# Patient Record
Sex: MALE | Race: OTHER | HISPANIC OR LATINO | URBAN - METROPOLITAN AREA
[De-identification: names, ages, dates, MRNs, and addresses within clinical notes are randomized per-mention and may not be internally consistent; named-entity substitution may affect disease eponyms.]

---

## 2020-06-01 ENCOUNTER — OUTPATIENT (OUTPATIENT)
Dept: OUTPATIENT SERVICES | Facility: HOSPITAL | Age: 60
LOS: 1 days | Discharge: HOME | End: 2020-06-01
Payer: MEDICAID

## 2020-06-01 DIAGNOSIS — F11.20 OPIOID DEPENDENCE, UNCOMPLICATED: ICD-10-CM

## 2020-06-01 PROCEDURE — 99205 OFFICE O/P NEW HI 60 MIN: CPT

## 2020-06-08 ENCOUNTER — OUTPATIENT (OUTPATIENT)
Dept: OUTPATIENT SERVICES | Facility: HOSPITAL | Age: 60
LOS: 1 days | Discharge: HOME | End: 2020-06-08

## 2020-06-08 DIAGNOSIS — F11.20 OPIOID DEPENDENCE, UNCOMPLICATED: ICD-10-CM

## 2020-06-08 LAB
A1C WITH ESTIMATED AVERAGE GLUCOSE RESULT: 8.5 % — HIGH (ref 4–5.6)
ALBUMIN SERPL ELPH-MCNC: 4.4 G/DL — SIGNIFICANT CHANGE UP (ref 3.5–5.2)
ALP SERPL-CCNC: 104 U/L — SIGNIFICANT CHANGE UP (ref 30–115)
ALT FLD-CCNC: 26 U/L — SIGNIFICANT CHANGE UP (ref 0–41)
ANION GAP SERPL CALC-SCNC: 16 MMOL/L — HIGH (ref 7–14)
APPEARANCE UR: CLEAR — SIGNIFICANT CHANGE UP
AST SERPL-CCNC: 15 U/L — SIGNIFICANT CHANGE UP (ref 0–41)
BILIRUB SERPL-MCNC: 0.4 MG/DL — SIGNIFICANT CHANGE UP (ref 0.2–1.2)
BILIRUB UR-MCNC: NEGATIVE — SIGNIFICANT CHANGE UP
BUN SERPL-MCNC: 12 MG/DL — SIGNIFICANT CHANGE UP (ref 10–20)
CALCIUM SERPL-MCNC: 9 MG/DL — SIGNIFICANT CHANGE UP (ref 8.5–10.1)
CHLORIDE SERPL-SCNC: 96 MMOL/L — LOW (ref 98–110)
CHOLEST SERPL-MCNC: 129 MG/DL — SIGNIFICANT CHANGE UP (ref 100–200)
CO2 SERPL-SCNC: 24 MMOL/L — SIGNIFICANT CHANGE UP (ref 17–32)
COLOR SPEC: SIGNIFICANT CHANGE UP
CREAT SERPL-MCNC: 0.8 MG/DL — SIGNIFICANT CHANGE UP (ref 0.7–1.5)
DIFF PNL FLD: NEGATIVE — SIGNIFICANT CHANGE UP
ESTIMATED AVERAGE GLUCOSE: 197 MG/DL — HIGH (ref 68–114)
ETHANOL SERPL-MCNC: <10 MG/DL — SIGNIFICANT CHANGE UP
GLUCOSE SERPL-MCNC: 362 MG/DL — HIGH (ref 70–99)
GLUCOSE UR QL: ABNORMAL
HCT VFR BLD CALC: 42.7 % — SIGNIFICANT CHANGE UP (ref 42–52)
HDLC SERPL-MCNC: 30 MG/DL — LOW
HGB BLD-MCNC: 13.7 G/DL — LOW (ref 14–18)
KETONES UR-MCNC: NEGATIVE — SIGNIFICANT CHANGE UP
LEUKOCYTE ESTERASE UR-ACNC: NEGATIVE — SIGNIFICANT CHANGE UP
LIPID PNL WITH DIRECT LDL SERPL: 69 MG/DL — SIGNIFICANT CHANGE UP (ref 4–129)
MAGNESIUM SERPL-MCNC: 1.9 MG/DL — SIGNIFICANT CHANGE UP (ref 1.8–2.4)
MCHC RBC-ENTMCNC: 28 PG — SIGNIFICANT CHANGE UP (ref 27–31)
MCHC RBC-ENTMCNC: 32.1 G/DL — SIGNIFICANT CHANGE UP (ref 32–37)
MCV RBC AUTO: 87.3 FL — SIGNIFICANT CHANGE UP (ref 80–94)
NITRITE UR-MCNC: NEGATIVE — SIGNIFICANT CHANGE UP
NRBC # BLD: 0 /100 WBCS — SIGNIFICANT CHANGE UP (ref 0–0)
PH UR: 6 — SIGNIFICANT CHANGE UP (ref 5–8)
PLATELET # BLD AUTO: 313 K/UL — SIGNIFICANT CHANGE UP (ref 130–400)
POTASSIUM SERPL-MCNC: 4.5 MMOL/L — SIGNIFICANT CHANGE UP (ref 3.5–5)
POTASSIUM SERPL-SCNC: 4.5 MMOL/L — SIGNIFICANT CHANGE UP (ref 3.5–5)
PROT SERPL-MCNC: 6.9 G/DL — SIGNIFICANT CHANGE UP (ref 6–8)
PROT UR-MCNC: NEGATIVE — SIGNIFICANT CHANGE UP
RBC # BLD: 4.89 M/UL — SIGNIFICANT CHANGE UP (ref 4.7–6.1)
RBC # FLD: 13 % — SIGNIFICANT CHANGE UP (ref 11.5–14.5)
SODIUM SERPL-SCNC: 136 MMOL/L — SIGNIFICANT CHANGE UP (ref 135–146)
SP GR SPEC: 1.02 — SIGNIFICANT CHANGE UP (ref 1.01–1.02)
TOTAL CHOLESTEROL/HDL RATIO MEASUREMENT: 4.3 RATIO — SIGNIFICANT CHANGE UP (ref 4–5.5)
TRIGL SERPL-MCNC: 241 MG/DL — HIGH (ref 10–149)
UROBILINOGEN FLD QL: SIGNIFICANT CHANGE UP
WBC # BLD: 7.12 K/UL — SIGNIFICANT CHANGE UP (ref 4.8–10.8)
WBC # FLD AUTO: 7.12 K/UL — SIGNIFICANT CHANGE UP (ref 4.8–10.8)

## 2020-06-09 LAB
HAV IGM SER-ACNC: SIGNIFICANT CHANGE UP
HBV CORE IGM SER-ACNC: SIGNIFICANT CHANGE UP
HBV SURFACE AG SER-ACNC: SIGNIFICANT CHANGE UP
HCV AB S/CO SERPL IA: 11.9 S/CO — HIGH (ref 0–0.99)
HCV AB SERPL-IMP: REACTIVE
HIV 1+2 AB+HIV1 P24 AG SERPL QL IA: SIGNIFICANT CHANGE UP
T PALLIDUM AB TITR SER: NEGATIVE — SIGNIFICANT CHANGE UP

## 2020-06-10 LAB
GAMMA INTERFERON BACKGROUND BLD IA-ACNC: 0.01 IU/ML — SIGNIFICANT CHANGE UP
HCV RNA FLD QL NAA+PROBE: SIGNIFICANT CHANGE UP
HCV RNA SPEC QL PROBE+SIG AMP: SIGNIFICANT CHANGE UP
M TB IFN-G BLD-IMP: NEGATIVE — SIGNIFICANT CHANGE UP
M TB IFN-G CD4+ BCKGRND COR BLD-ACNC: 0 IU/ML — SIGNIFICANT CHANGE UP
M TB IFN-G CD4+CD8+ BCKGRND COR BLD-ACNC: 0 IU/ML — SIGNIFICANT CHANGE UP
QUANT TB PLUS MITOGEN MINUS NIL: 8.3 IU/ML — SIGNIFICANT CHANGE UP

## 2020-06-15 ENCOUNTER — OUTPATIENT (OUTPATIENT)
Dept: OUTPATIENT SERVICES | Facility: HOSPITAL | Age: 60
LOS: 1 days | Discharge: HOME | End: 2020-06-15

## 2020-06-15 DIAGNOSIS — F11.20 OPIOID DEPENDENCE, UNCOMPLICATED: ICD-10-CM

## 2020-06-22 ENCOUNTER — OUTPATIENT (OUTPATIENT)
Dept: OUTPATIENT SERVICES | Facility: HOSPITAL | Age: 60
LOS: 1 days | Discharge: HOME | End: 2020-06-22

## 2020-06-22 DIAGNOSIS — F11.20 OPIOID DEPENDENCE, UNCOMPLICATED: ICD-10-CM

## 2020-06-29 ENCOUNTER — OUTPATIENT (OUTPATIENT)
Dept: OUTPATIENT SERVICES | Facility: HOSPITAL | Age: 60
LOS: 1 days | Discharge: HOME | End: 2020-06-29
Payer: MEDICAID

## 2020-06-29 DIAGNOSIS — F11.20 OPIOID DEPENDENCE, UNCOMPLICATED: ICD-10-CM

## 2020-06-29 PROCEDURE — 99212 OFFICE O/P EST SF 10 MIN: CPT

## 2020-07-09 ENCOUNTER — EMERGENCY (EMERGENCY)
Facility: HOSPITAL | Age: 60
LOS: 0 days | Discharge: HOME | End: 2020-07-10
Attending: EMERGENCY MEDICINE | Admitting: EMERGENCY MEDICINE
Payer: MEDICAID

## 2020-07-09 VITALS
OXYGEN SATURATION: 97 % | HEART RATE: 105 BPM | RESPIRATION RATE: 16 BRPM | DIASTOLIC BLOOD PRESSURE: 92 MMHG | WEIGHT: 171.96 LBS | TEMPERATURE: 98 F | SYSTOLIC BLOOD PRESSURE: 176 MMHG

## 2020-07-09 DIAGNOSIS — R07.89 OTHER CHEST PAIN: ICD-10-CM

## 2020-07-09 DIAGNOSIS — Z20.828 CONTACT WITH AND (SUSPECTED) EXPOSURE TO OTHER VIRAL COMMUNICABLE DISEASES: ICD-10-CM

## 2020-07-09 DIAGNOSIS — R00.0 TACHYCARDIA, UNSPECIFIED: ICD-10-CM

## 2020-07-09 DIAGNOSIS — R07.9 CHEST PAIN, UNSPECIFIED: ICD-10-CM

## 2020-07-09 LAB
ALBUMIN SERPL ELPH-MCNC: 4.8 G/DL — SIGNIFICANT CHANGE UP (ref 3.5–5.2)
ALP SERPL-CCNC: 100 U/L — SIGNIFICANT CHANGE UP (ref 30–115)
ALT FLD-CCNC: 22 U/L — SIGNIFICANT CHANGE UP (ref 0–41)
ANION GAP SERPL CALC-SCNC: 12 MMOL/L — SIGNIFICANT CHANGE UP (ref 7–14)
APTT BLD: >200 SEC — CRITICAL HIGH (ref 27–39.2)
AST SERPL-CCNC: 17 U/L — SIGNIFICANT CHANGE UP (ref 0–41)
BASE EXCESS BLDV CALC-SCNC: 2.9 MMOL/L — HIGH (ref -2–2)
BASOPHILS # BLD AUTO: 0.04 K/UL — SIGNIFICANT CHANGE UP (ref 0–0.2)
BASOPHILS NFR BLD AUTO: 0.4 % — SIGNIFICANT CHANGE UP (ref 0–1)
BILIRUB SERPL-MCNC: 0.7 MG/DL — SIGNIFICANT CHANGE UP (ref 0.2–1.2)
BUN SERPL-MCNC: 14 MG/DL — SIGNIFICANT CHANGE UP (ref 10–20)
CA-I SERPL-SCNC: 1.23 MMOL/L — SIGNIFICANT CHANGE UP (ref 1.12–1.3)
CALCIUM SERPL-MCNC: 10 MG/DL — SIGNIFICANT CHANGE UP (ref 8.5–10.1)
CHLORIDE SERPL-SCNC: 100 MMOL/L — SIGNIFICANT CHANGE UP (ref 98–110)
CO2 SERPL-SCNC: 27 MMOL/L — SIGNIFICANT CHANGE UP (ref 17–32)
CREAT SERPL-MCNC: 1 MG/DL — SIGNIFICANT CHANGE UP (ref 0.7–1.5)
D DIMER BLD IA.RAPID-MCNC: 436 NG/ML DDU — HIGH (ref 0–230)
EOSINOPHIL # BLD AUTO: 0.06 K/UL — SIGNIFICANT CHANGE UP (ref 0–0.7)
EOSINOPHIL NFR BLD AUTO: 0.7 % — SIGNIFICANT CHANGE UP (ref 0–8)
GAS PNL BLDV: 138 MMOL/L — SIGNIFICANT CHANGE UP (ref 136–145)
GAS PNL BLDV: SIGNIFICANT CHANGE UP
GLUCOSE SERPL-MCNC: 333 MG/DL — HIGH (ref 70–99)
HCO3 BLDV-SCNC: 30 MMOL/L — HIGH (ref 22–29)
HCT VFR BLD CALC: 45.4 % — SIGNIFICANT CHANGE UP (ref 42–52)
HCT VFR BLDA CALC: 44.5 % — HIGH (ref 34–44)
HGB BLD CALC-MCNC: 14.5 G/DL — SIGNIFICANT CHANGE UP (ref 14–18)
HGB BLD-MCNC: 14.7 G/DL — SIGNIFICANT CHANGE UP (ref 14–18)
IMM GRANULOCYTES NFR BLD AUTO: 0.2 % — SIGNIFICANT CHANGE UP (ref 0.1–0.3)
INR BLD: 9.9 RATIO — CRITICAL HIGH (ref 0.65–1.3)
LACTATE BLDV-MCNC: 1.4 MMOL/L — SIGNIFICANT CHANGE UP (ref 0.5–1.6)
LYMPHOCYTES # BLD AUTO: 1.28 K/UL — SIGNIFICANT CHANGE UP (ref 1.2–3.4)
LYMPHOCYTES # BLD AUTO: 14.3 % — LOW (ref 20.5–51.1)
MAGNESIUM SERPL-MCNC: 2 MG/DL — SIGNIFICANT CHANGE UP (ref 1.8–2.4)
MCHC RBC-ENTMCNC: 28.1 PG — SIGNIFICANT CHANGE UP (ref 27–31)
MCHC RBC-ENTMCNC: 32.4 G/DL — SIGNIFICANT CHANGE UP (ref 32–37)
MCV RBC AUTO: 86.8 FL — SIGNIFICANT CHANGE UP (ref 80–94)
MONOCYTES # BLD AUTO: 0.52 K/UL — SIGNIFICANT CHANGE UP (ref 0.1–0.6)
MONOCYTES NFR BLD AUTO: 5.8 % — SIGNIFICANT CHANGE UP (ref 1.7–9.3)
NEUTROPHILS # BLD AUTO: 7.05 K/UL — HIGH (ref 1.4–6.5)
NEUTROPHILS NFR BLD AUTO: 78.6 % — HIGH (ref 42.2–75.2)
NRBC # BLD: 0 /100 WBCS — SIGNIFICANT CHANGE UP (ref 0–0)
NT-PROBNP SERPL-SCNC: 46 PG/ML — SIGNIFICANT CHANGE UP (ref 0–300)
PCO2 BLDV: 56 MMHG — HIGH (ref 41–51)
PH BLDV: 7.34 — SIGNIFICANT CHANGE UP (ref 7.26–7.43)
PLATELET # BLD AUTO: 145 K/UL — SIGNIFICANT CHANGE UP (ref 130–400)
PO2 BLDV: 72 MMHG — HIGH (ref 20–40)
POTASSIUM BLDV-SCNC: 4.3 MMOL/L — SIGNIFICANT CHANGE UP (ref 3.3–5.6)
POTASSIUM SERPL-MCNC: 4.3 MMOL/L — SIGNIFICANT CHANGE UP (ref 3.5–5)
POTASSIUM SERPL-SCNC: 4.3 MMOL/L — SIGNIFICANT CHANGE UP (ref 3.5–5)
PROT SERPL-MCNC: 7.7 G/DL — SIGNIFICANT CHANGE UP (ref 6–8)
PROTHROM AB SERPL-ACNC: >40 SEC — HIGH (ref 9.95–12.87)
RBC # BLD: 5.23 M/UL — SIGNIFICANT CHANGE UP (ref 4.7–6.1)
RBC # FLD: 12.5 % — SIGNIFICANT CHANGE UP (ref 11.5–14.5)
SAO2 % BLDV: 95 % — SIGNIFICANT CHANGE UP
SODIUM SERPL-SCNC: 139 MMOL/L — SIGNIFICANT CHANGE UP (ref 135–146)
TROPONIN T SERPL-MCNC: <0.01 NG/ML — SIGNIFICANT CHANGE UP
WBC # BLD: 8.97 K/UL — SIGNIFICANT CHANGE UP (ref 4.8–10.8)
WBC # FLD AUTO: 8.97 K/UL — SIGNIFICANT CHANGE UP (ref 4.8–10.8)

## 2020-07-09 PROCEDURE — 99285 EMERGENCY DEPT VISIT HI MDM: CPT

## 2020-07-09 PROCEDURE — 93010 ELECTROCARDIOGRAM REPORT: CPT

## 2020-07-09 RX ORDER — SODIUM CHLORIDE 9 MG/ML
1000 INJECTION INTRAMUSCULAR; INTRAVENOUS; SUBCUTANEOUS ONCE
Refills: 0 | Status: COMPLETED | OUTPATIENT
Start: 2020-07-09 | End: 2020-07-09

## 2020-07-09 RX ADMIN — SODIUM CHLORIDE 1000 MILLILITER(S): 9 INJECTION INTRAMUSCULAR; INTRAVENOUS; SUBCUTANEOUS at 23:30

## 2020-07-09 NOTE — ED ADULT NURSE NOTE - OBJECTIVE STATEMENT
Pt presents c/o right sided chest pain worse with deep breathing , non radiating ,reports headache , denies dizziness , AO x 4 , no accessory  muscles used , speaks in full sentences , denies nausea, no vomiting noted , no selling on marcelo upper and lower extremities noted , HX PE/DVT , afebriel , no cough , no syncopal episode , denies abdominal pain , ambulatory , no seizure episode

## 2020-07-09 NOTE — ED ADULT NURSE NOTE - NSIMPLEMENTINTERV_GEN_ALL_ED
Implemented All Universal Safety Interventions:  [place]livia to call system. Call bell, personal items and telephone within reach. Instruct patient to call for assistance. Room bathroom lighting operational. Non-slip footwear when patient is off stretcher. Physically safe environment: no spills, clutter or unnecessary equipment. Stretcher in lowest position, wheels locked, appropriate side rails in place.

## 2020-07-09 NOTE — ED PROVIDER NOTE - MDM PATIENT STATEMENT FOR ADDL TREATMENT
Refill request received for Atorvastatin. Last OV: 5/17/19. Next OV: 12/20/19. Script e-scribed.     Patient with one or more new problems requiring additional work-up/treatment.

## 2020-07-09 NOTE — ED PROVIDER NOTE - NS ED ROS FT
Constitutional: See HPI.  Eyes: No visual changes, eye pain or discharge.   ENMT: No hearing changes, pain, discharge or infections. No neck pain or stiffness. No limited ROM  Cardiac: + chest pain.  No SOB or edema. No chest pain with exertion.  Respiratory: No cough or respiratory distress. No hemoptysis  GI: No nausea, vomiting, diarrhea or abdominal pain.  : No dysuria, frequency or burning. No Discharge  MS: No myalgia, muscle weakness, joint pain or back pain.  Neuro: No headache or weakness.   Skin: No skin rash.  Except as documented in the HPI, all other systems are negative.

## 2020-07-09 NOTE — ED PROVIDER NOTE - PHYSICAL EXAMINATION
CONST: Well appearing in NAD  EYES: Sclera and conjunctiva clear.  CARD: + tachycardic, regular   RESP: + mild crackles R bases. mild tachypnea, speaking in full sentences, no accessory muscle use, No distress  GI: Soft, non-tender, non-distended.  MS: Normal ROM in all extremities. No edema of lower extremities, no calf pain, radial pulses 2+ bilaterally  SKIN: Warm, dry, no acute rashes. Good turgor  NEURO: A&Ox3, No focal deficits. Strength 5/5 with no sensory deficits

## 2020-07-09 NOTE — ED PROVIDER NOTE - ATTENDING CONTRIBUTION TO CARE
pt co mid sternal cp, sob sweats starting today. no feve,r chills, cough, leg pain or swelling.   pt in nad, pink conj neck sup cta, rrr, ab s nt. legs nt, no edema. nvi.  ekg, labs, cxr, cardiac monitoring.

## 2020-07-09 NOTE — ED PROVIDER NOTE - OBJECTIVE STATEMENT
60 y.o male w/ hx of HTN, HLD, DM presents to the ED for evaluation of chest pain x 1 hr.  Acute onset R sided chest pain, constant, worse w/ taking deep breath, nonexertional, mild severity, no radiation of pain.  Denies fever, chills, cough, edema of lower extremities, calf pain, hemoptysis, hx of PE/DVT, swelling of lower extremities, calf tenderness,  recent surgery, no hormone use. no recent cardiac work up, fhx stents at age 70. + smoker

## 2020-07-10 VITALS
HEART RATE: 70 BPM | OXYGEN SATURATION: 98 % | DIASTOLIC BLOOD PRESSURE: 91 MMHG | TEMPERATURE: 98 F | RESPIRATION RATE: 18 BRPM | SYSTOLIC BLOOD PRESSURE: 160 MMHG

## 2020-07-10 LAB
APTT BLD: 29.3 SEC — SIGNIFICANT CHANGE UP (ref 27–39.2)
INR BLD: 1.09 RATIO — SIGNIFICANT CHANGE UP (ref 0.65–1.3)
PROTHROM AB SERPL-ACNC: 12.5 SEC — SIGNIFICANT CHANGE UP (ref 9.95–12.87)
SARS-COV-2 RNA SPEC QL NAA+PROBE: SIGNIFICANT CHANGE UP
TROPONIN T SERPL-MCNC: <0.01 NG/ML — SIGNIFICANT CHANGE UP

## 2020-07-10 PROCEDURE — 93016 CV STRESS TEST SUPVJ ONLY: CPT

## 2020-07-10 PROCEDURE — 93010 ELECTROCARDIOGRAM REPORT: CPT

## 2020-07-10 PROCEDURE — 71046 X-RAY EXAM CHEST 2 VIEWS: CPT | Mod: 26

## 2020-07-10 PROCEDURE — 99236 HOSP IP/OBS SAME DATE HI 85: CPT

## 2020-07-10 PROCEDURE — 93018 CV STRESS TEST I&R ONLY: CPT

## 2020-07-10 PROCEDURE — 71275 CT ANGIOGRAPHY CHEST: CPT | Mod: 26

## 2020-07-10 PROCEDURE — 78452 HT MUSCLE IMAGE SPECT MULT: CPT | Mod: 26

## 2020-07-10 RX ORDER — ADENOSINE 3 MG/ML
60 INJECTION INTRAVENOUS ONCE
Refills: 0 | Status: COMPLETED | OUTPATIENT
Start: 2020-07-10 | End: 2020-07-10

## 2020-07-10 RX ORDER — ASPIRIN/CALCIUM CARB/MAGNESIUM 324 MG
325 TABLET ORAL ONCE
Refills: 0 | Status: COMPLETED | OUTPATIENT
Start: 2020-07-10 | End: 2020-07-10

## 2020-07-10 RX ADMIN — SODIUM CHLORIDE 1000 MILLILITER(S): 9 INJECTION INTRAMUSCULAR; INTRAVENOUS; SUBCUTANEOUS at 00:39

## 2020-07-10 RX ADMIN — ADENOSINE 600 MILLIGRAM(S): 3 INJECTION INTRAVENOUS at 09:26

## 2020-07-10 RX ADMIN — Medication 325 MILLIGRAM(S): at 01:10

## 2020-07-10 NOTE — ED CDU PROVIDER INITIAL DAY NOTE - OBJECTIVE STATEMENT
pt is a 60y male with pmhx of htn, hld, Dm comes to the ed c/o CP x 1hr prior to arrival. pt has right sided and constant CP non radiating. pts CP worse upon inspiration. pt has + family hx of CAD and + smoker occasional. pt denies fever, chills, DVT/PE, nausea, vomiting, dizziness, Headache, abdominal pain.

## 2020-07-10 NOTE — ED CDU PROVIDER DISPOSITION NOTE - PATIENT PORTAL LINK FT
You can access the FollowMyHealth Patient Portal offered by [facility] by registering at the following website: [email or web address]. By joining [employer]’s FollowMyHealth portal, you will also be able to view your health information using other applications (apps) compatible with our system.

## 2020-07-10 NOTE — ED CDU PROVIDER DISPOSITION NOTE - CARE PROVIDER_API CALL
Mike Michel)  Cardiovascular Disease; Internal Medicine; Interventional Cardiology; Nuclear Cardiology  [address] [place] [address]  Phone: [phone]  Fax: [phone]  Follow Up Time:

## 2020-07-10 NOTE — ED CDU PROVIDER INITIAL DAY NOTE - CONSTITUTIONAL, MLM
normal... Well appearing, awake, alert, oriented to person, place, time/situation and in no apparent distress. somnolent,  alert, oriented to person, place, time/situation and in no apparent distress.

## 2020-07-10 NOTE — ED ADULT NURSE REASSESSMENT NOTE - NS ED NURSE REASSESS COMMENT FT1
Pt assessed A/O times 4 Vs stable on cardiac monitor denies chest pain no SOB no N/V no dizziness ambulate steady ,pt is seen evaluate  by ED attending ready to be transport for NM with transporter .

## 2020-07-10 NOTE — ED ADULT NURSE REASSESSMENT NOTE - NS ED NURSE REASSESS COMMENT FT1
resting comfortably on bed , asleep , no grimaced face noted , no labored breathing ,no vomiting noted , no accessory muscles used , on continuous cardiac monitor, nursing rounds done , call light within reached , on OBSERVATION procedure

## 2020-07-10 NOTE — ED ADULT NURSE REASSESSMENT NOTE - NS ED NURSE REASSESS COMMENT FT1
Pt reassessed A/O times 4 Vs stable report filling slight better NM stress test order is done completed ,pt is seen evaluate by ED attending clear to go home NAD noted ambulate steady .

## 2020-07-10 NOTE — ED CDU PROVIDER INITIAL DAY NOTE - ATTENDING CONTRIBUTION TO CARE
RECEIVED IN MEDICAL RELEASE FOR PROCESSING.   61yo man h/o HTN, HLD, DM was placed in CDU for workup of chest pain after an unremarkable ED workup including EKG, labs, CT for PE. VS, exam as noted, pt asymptomatic on my eval. Plan is for telemetry, serial EKG/enzymes, nuclear stress test. Pt amenable.

## 2020-07-10 NOTE — ED CDU PROVIDER INITIAL DAY NOTE - PROGRESS NOTE DETAILS
Sign out received by BERNICE Wang. Pt seen resting comfortably at bedside. No complaints. Pending nuclear.

## 2020-07-10 NOTE — ED CDU PROVIDER DISPOSITION NOTE - ATTENDING CONTRIBUTION TO CARE
61yo man h/o HTN, HLD, DM was placed in CDU for workup of chest pain after an unremarkable ED workup including EKG, labs, CT for PE. VS, exam as noted, pt asymptomatic on my eval. Pt monitored without incident, repeat EKG and enzymes unchanged. Nuclear stress test is unremarkable. Pt comfortable with d/c.

## 2020-07-10 NOTE — ED CDU PROVIDER INITIAL DAY NOTE - MEDICAL DECISION MAKING DETAILS
59yo man h/o HTN, HLD, DM was placed in CDU for workup of chest pain after an unremarkable ED workup including EKG, labs, CT for PE. VS, exam as noted, pt asymptomatic on my eval. Plan is for telemetry, serial EKG/enzymes, nuclear stress test. Pt amenable.

## 2020-07-10 NOTE — ED CDU PROVIDER DISPOSITION NOTE - CARE PROVIDERS DIRECT ADDRESSES
[email or web address]

## 2020-07-13 ENCOUNTER — OUTPATIENT (OUTPATIENT)
Dept: OUTPATIENT SERVICES | Facility: HOSPITAL | Age: 60
LOS: 1 days | Discharge: HOME | End: 2020-07-13

## 2020-07-13 DIAGNOSIS — F11.20 OPIOID DEPENDENCE, UNCOMPLICATED: ICD-10-CM

## 2020-07-23 ENCOUNTER — EMERGENCY (EMERGENCY)
Facility: HOSPITAL | Age: 60
LOS: 0 days | Discharge: HOME | End: 2020-07-23
Attending: EMERGENCY MEDICINE | Admitting: EMERGENCY MEDICINE
Payer: MEDICAID

## 2020-07-23 VITALS
HEART RATE: 100 BPM | DIASTOLIC BLOOD PRESSURE: 87 MMHG | SYSTOLIC BLOOD PRESSURE: 151 MMHG | OXYGEN SATURATION: 97 % | RESPIRATION RATE: 16 BRPM

## 2020-07-23 VITALS
WEIGHT: 169.98 LBS | RESPIRATION RATE: 19 BRPM | TEMPERATURE: 98 F | SYSTOLIC BLOOD PRESSURE: 177 MMHG | OXYGEN SATURATION: 95 % | HEART RATE: 117 BPM | DIASTOLIC BLOOD PRESSURE: 89 MMHG

## 2020-07-23 DIAGNOSIS — Z79.4 LONG TERM (CURRENT) USE OF INSULIN: ICD-10-CM

## 2020-07-23 DIAGNOSIS — K21.9 GASTRO-ESOPHAGEAL REFLUX DISEASE WITHOUT ESOPHAGITIS: ICD-10-CM

## 2020-07-23 DIAGNOSIS — Z79.899 OTHER LONG TERM (CURRENT) DRUG THERAPY: ICD-10-CM

## 2020-07-23 DIAGNOSIS — Z76.0 ENCOUNTER FOR ISSUE OF REPEAT PRESCRIPTION: ICD-10-CM

## 2020-07-23 DIAGNOSIS — E11.9 TYPE 2 DIABETES MELLITUS WITHOUT COMPLICATIONS: ICD-10-CM

## 2020-07-23 DIAGNOSIS — Z79.1 LONG TERM (CURRENT) USE OF NON-STEROIDAL ANTI-INFLAMMATORIES (NSAID): ICD-10-CM

## 2020-07-23 DIAGNOSIS — F17.200 NICOTINE DEPENDENCE, UNSPECIFIED, UNCOMPLICATED: ICD-10-CM

## 2020-07-23 DIAGNOSIS — I10 ESSENTIAL (PRIMARY) HYPERTENSION: ICD-10-CM

## 2020-07-23 PROBLEM — Z00.00 ENCOUNTER FOR PREVENTIVE HEALTH EXAMINATION: Status: ACTIVE | Noted: 2020-07-23

## 2020-07-23 PROCEDURE — 99283 EMERGENCY DEPT VISIT LOW MDM: CPT

## 2020-07-23 RX ORDER — ASPIRIN/CALCIUM CARB/MAGNESIUM 324 MG
1 TABLET ORAL
Qty: 14 | Refills: 0
Start: 2020-07-23 | End: 2020-08-05

## 2020-07-23 RX ORDER — OMEPRAZOLE 10 MG/1
1 CAPSULE, DELAYED RELEASE ORAL
Qty: 14 | Refills: 0
Start: 2020-07-23 | End: 2020-08-05

## 2020-07-23 RX ORDER — INSULIN HUMAN 100 [IU]/ML
5 INJECTION, SOLUTION SUBCUTANEOUS
Qty: 140 | Refills: 0
Start: 2020-07-23 | End: 2020-08-05

## 2020-07-23 RX ORDER — AMLODIPINE BESYLATE 2.5 MG/1
1 TABLET ORAL
Qty: 14 | Refills: 0
Start: 2020-07-23 | End: 2020-08-05

## 2020-07-23 RX ORDER — ATORVASTATIN CALCIUM 80 MG/1
1 TABLET, FILM COATED ORAL
Qty: 14 | Refills: 0
Start: 2020-07-23 | End: 2020-08-05

## 2020-07-23 RX ORDER — IBUPROFEN 200 MG
1 TABLET ORAL
Qty: 30 | Refills: 0
Start: 2020-07-23 | End: 2020-08-01

## 2020-07-23 RX ORDER — INSULIN GLARGINE 100 [IU]/ML
35 INJECTION, SOLUTION SUBCUTANEOUS
Qty: 1 | Refills: 0
Start: 2020-07-23 | End: 2020-08-05

## 2020-07-23 RX ORDER — LISINOPRIL 2.5 MG/1
1 TABLET ORAL
Qty: 14 | Refills: 0
Start: 2020-07-23 | End: 2020-08-05

## 2020-07-23 RX ORDER — METFORMIN HYDROCHLORIDE 850 MG/1
1 TABLET ORAL
Qty: 28 | Refills: 0
Start: 2020-07-23 | End: 2020-08-05

## 2020-07-23 NOTE — ED ADULT NURSE NOTE - OBJECTIVE STATEMENT
Pt presented to ED requesting refills for his insulin and htn medications. Denies n/v/fever/cp. Alert and oriented x3. Ambulates steadily and independently at this time.

## 2020-07-23 NOTE — ED PROVIDER NOTE - OBJECTIVE STATEMENT
59 y/o M, PMHx DM, Dyslipidemia, HTN, GERD & Substance Dependence - on Suboxone, presents to the ED requesting a refill of his medications. He states that he was previously incarcerated for a period of five years - released in May 2020 and has had enough medication until this week. He states that he has enough for the next two days and has been compliant. He denies chest pain, dyspnea, nausea, vomiting, fever, chills, abdominal pain and cephalgia.

## 2020-07-23 NOTE — ED ADULT NURSE NOTE - CHPI ED NUR SYMPTOMS NEG
no pain/no decreased eating/drinking/no nausea/no fever/no weakness/no chills/no vomiting/no tingling/no dizziness

## 2020-07-23 NOTE — ED PROVIDER NOTE - CLINICAL SUMMARY MEDICAL DECISION MAKING FREE TEXT BOX
Pt comes in asking for medication refill. States he has an apt with PMD in 2 wks. No complains. On exam, pt in NAD, AAOx3, head NC/AT, CN II-XII intact, lungs CTA B/L, CV S1S2 regular, abdomen soft/NT/ND/(+)BS, ext (-) edema, motor 5/5x4, sensation intact. Meds refilled. Will d/c.

## 2020-07-23 NOTE — ED PROVIDER NOTE - PATIENT PORTAL LINK FT
You can access the FollowMyHealth Patient Portal offered by [facility] by registering at the following website: [email or web address]. By joining [employer]’s FollowMyHealth portal, you will also be able to view your health information using other applications (apps) compatible with our system.

## 2020-07-23 NOTE — ED ADULT TRIAGE NOTE - CHIEF COMPLAINT QUOTE
patient request refills for lantus, regular insulin and htn medication, denies nausea, vomiting, fever, BS: 284

## 2020-07-23 NOTE — ED PROVIDER NOTE - NSFOLLOWUPINSTRUCTIONS_ED_ALL_ED_FT
Medicine Refill    WHAT YOU NEED TO KNOW:    It is important to refill your medicine before you completely run out. You need to follow up with your healthcare provider for a full prescription within the next few days. You will not be given additional refills in the emergency department.     DISCHARGE INSTRUCTIONS:    Follow up with your healthcare provider: Contact your healthcare provider before you are completely out of medicine. Write down your questions so you remember to ask them during your visits.     Refill tips: Your medicine will treat your condition if you take the medicine regularly. Prevent missed doses by doing the following:     Keep a chart of your medicine. Include all of your current medicines. Write down the name and strength of each medicine, the prescription number, and the number of refills. Also write down the dates of your refills. Ask your pharmacy or insurance provider for other ways to help you keep track of your medicines.      Refill medicines a few days before you run out. This will decrease any problems that will prevent you from getting your medicines on time. Problems include a closed pharmacy, or the pharmacy may have to contact your healthcare provider.      If you know you are going to be traveling, refill your medicines before you leave. You may not be able to get refills if you do not use your local pharmacy. You may need to call your insurance provider to make them aware of your travels.

## 2020-07-23 NOTE — ED PROVIDER NOTE - CCCP TRG CHIEF CMPLNT
Received referral for Right upper quadrant abdominal pain. Please advise.   see chief complaint quote

## 2020-07-23 NOTE — ED PROVIDER NOTE - NSFOLLOWUPCLINICS_GEN_ALL_ED_FT
[facility]  Medicine  [address], [place]   Phone: [phone]  Fax:   Follow Up Time: 7-10 Days

## 2020-07-23 NOTE — ED PROVIDER NOTE - ATTENDING CONTRIBUTION TO CARE
Pt comes in asking for medication refill. States he has an apt with PMD in 2 wks. No complains. On exam, pt in NAD, AAOx3, head NC/AT, CN II-XII intact, lungs CTA B/L, CV S1S2 regular, abdomen soft/NT/ND/(+)BS, ext (-) edema, motor 5/5x4, sensation intact. Will refill meds and d/c.

## 2020-07-27 ENCOUNTER — OUTPATIENT (OUTPATIENT)
Dept: OUTPATIENT SERVICES | Facility: HOSPITAL | Age: 60
LOS: 1 days | Discharge: HOME | End: 2020-07-27

## 2020-07-27 DIAGNOSIS — F11.20 OPIOID DEPENDENCE, UNCOMPLICATED: ICD-10-CM

## 2020-07-27 PROBLEM — I10 ESSENTIAL (PRIMARY) HYPERTENSION: Chronic | Status: ACTIVE | Noted: 2020-07-09

## 2020-08-03 ENCOUNTER — OUTPATIENT (OUTPATIENT)
Dept: OUTPATIENT SERVICES | Facility: HOSPITAL | Age: 60
LOS: 1 days | Discharge: HOME | End: 2020-08-03

## 2020-08-03 DIAGNOSIS — F11.20 OPIOID DEPENDENCE, UNCOMPLICATED: ICD-10-CM

## 2020-09-28 ENCOUNTER — APPOINTMENT (OUTPATIENT)
Dept: INTERNAL MEDICINE | Facility: CLINIC | Age: 60
End: 2020-09-28

## 2020-11-16 ENCOUNTER — INPATIENT (INPATIENT)
Facility: HOSPITAL | Age: 60
LOS: 2 days | Discharge: LEFT BEFORE DISCHARGE | End: 2020-11-19
Attending: HOSPITALIST | Admitting: HOSPITALIST
Payer: MEDICAID

## 2020-11-16 VITALS
OXYGEN SATURATION: 98 % | RESPIRATION RATE: 17 BRPM | DIASTOLIC BLOOD PRESSURE: 74 MMHG | TEMPERATURE: 97 F | HEIGHT: 65 IN | WEIGHT: 160.06 LBS | HEART RATE: 94 BPM | SYSTOLIC BLOOD PRESSURE: 118 MMHG

## 2020-11-16 PROCEDURE — 99285 EMERGENCY DEPT VISIT HI MDM: CPT

## 2020-11-16 PROCEDURE — 93010 ELECTROCARDIOGRAM REPORT: CPT

## 2020-11-16 NOTE — ED PROVIDER NOTE - PMH
Diabetes mellitus    GERD (gastroesophageal reflux disease)    Hepatitis C    HTN (hypertension)    Hyperlipidemia

## 2020-11-16 NOTE — ED PROVIDER NOTE - CARE PLAN
Principal Discharge DX:	Positive QuantiFERON-TB Gold test  Secondary Diagnosis:	ADOLFO (acute kidney injury)  Secondary Diagnosis:	Shortness of breath

## 2020-11-16 NOTE — ED PROVIDER NOTE - NS ED ROS FT
Review of Systems:  CONSTITUTIONAL: +increased sweating including night sweats. No fever/chills, no weight loss/gain  SKIN: No rash  RESPIRATORY: +shortness of breath. No hemoptysis.  CARDIAC: No chest pain, No palpitations  GI: No abdominal pain, No nausea, No vomiting, No diarrhea  MUSCULOSKELETAL: No joint paint, No swelling, No back pain  NEUROLOGIC: No numbness, No focal weakness, No headache, No dizziness  All other systems negative, unless specified in HPI

## 2020-11-16 NOTE — ED ADULT NURSE NOTE - OBJECTIVE STATEMENT
60 y M presents to ED after 6 days in rehab for heroin use with + quantiferon gold. Patient states that he took public transportation from [place] to [facility]. No medical complaints at this time. denies fever/cough/SOB/pain.

## 2020-11-16 NOTE — ED PROVIDER NOTE - OBJECTIVE STATEMENT
60M with PMHx of HCV, polysubstance abuse, HTN, HL, insulin dependent DM, GERD presents to ED for positive quantiferon. Pt reports he recently completed detox and had baseline labs done. He was told today that his quantiferon is positive. Endorses continued polysubstance use including heroin and cocaine. Pt endorses SOB and increased sweating the last week.  Denies fever/chills, weight loss/gain, chest pain, hemoptysis, abd pain, nausea, vomiting, headache, weakness, dizziness, all other complaints. No recent travel out of state/country, no sick contacts. 60M with PMHx of HCV, polysubstance abuse, HTN, HL, insulin dependent DM, GERD presents to ED for positive quantiferon. Pt reports he recently completed detox and had baseline labs done. He was told today that his quantiferon is positive. Endorses continued polysubstance use including heroin and cocaine. Pt endorses SOB and increased sweating the last week.  Denies fever/chills, weight loss/gain, chest pain, hemoptysis, abd pain, nausea, vomiting, headache, weakness, dizziness, all other complaints. No recent travel out of state/country, no sick contacts. Patient was previously incarcerated.

## 2020-11-16 NOTE — ED PROVIDER NOTE - CLINICAL SUMMARY MEDICAL DECISION MAKING FREE TEXT BOX
60 yr old m who presents today due to positive QuantiFeron. labs, ekg, cxr obtained. pt admitted to medicine.

## 2020-11-16 NOTE — ED PROVIDER NOTE - ATTENDING CONTRIBUTION TO CARE
60 yr old m w/ a pmh significant for dm, hld, htn, gerd, substance abuse (heroin) who presents today due to abnormal labs. Pt states that he recently completed detox, and had baseline lab work done. Pt states that he was told that his quantiferon was positive. Pt does endorse SOB, but denies any other medical complaints.     Review of Systems    Constitutional: (-) fever  Cardiovascular: (-) chest pain, (-) syncope  Respiratory: (-) cough, (+) shortness of breath  Gastrointestinal: (-) vomiting, (-) diarrhea, (-) abdominal pain  Musculoskeletal: (-) neck pain, (-) back pain, (-) joint pain  Integumentary: (-) rash, (-) edema  Neurological: (-) headache, (-) altered mental status    Except as documented in the HPI, all other systems are negative.    VITAL SIGNS: I have reviewed nursing notes and confirm.  CONSTITUTIONAL: non-toxic, well appearing  SKIN: no rash, no petechiae.  EYES: PERRL, EOMI, pink conjunctiva, anicteric  ENT: tongue midline, no exudates, MMM  NECK: Supple; no meningismus, no JVD  CARD: RRR, no murmurs, equal radial pulses bilaterally 2+  RESP: CTAB, no respiratory distress  ABD: Soft, non-tender, non-distended, no peritoneal signs, no HSM, no CVA tenderness  EXT: Normal ROM x4. No edema. No calves tenderness  NEURO: Alert, oriented. CN2-12 intact, equal strength bilaterally, nl gait.  PSYCH: Cooperative, appropriate.    a/p  60 yr old m that presents due to concern for TB due to positive QuantiFeron   -labs  -cxr  -ekg  -consider admission

## 2020-11-16 NOTE — ED ADULT TRIAGE NOTE - CHIEF COMPLAINT QUOTE
I just got out of detox, they said I tested positive for TB and to come to the hospital for a chest x-ray - patient      Patient was in detox for heroin in [place] called [facility], has paperwork with lab results

## 2020-11-16 NOTE — ED PROVIDER NOTE - PHYSICAL EXAMINATION
VITAL SIGNS: I have reviewed nursing notes and confirm.  CONSTITUTIONAL: WDWN man sitting in stretcher in NAD  SKIN: Warm dry, normal skin turgor  HEAD: NCAT  EYES: EOMI, no scleral icterus  ENT: Moist mucous membranes, normal pharynx with no erythema or exudates  NECK: Supple; non tender.   CARD: RRR, no murmurs, rubs or gallops  RESP: clear to ausculation b/l.  No rales, rhonchi, or wheezing.  ABD: soft, non-tender, non-distended, no rebound or guarding.   EXT: Full ROM, no calf tenderness  NEURO: normal motor. normal sensory.  Normal gait.  PSYCH: Cooperative, appropriate.

## 2020-11-16 NOTE — ED ADULT NURSE NOTE - CHPI ED NUR SYMPTOMS NEG
no shortness of breath/no cough/no chills/no edema/no chest pain/no diaphoresis/no wheezing/no body aches/no headache/no hemoptysis/no fever

## 2020-11-16 NOTE — ED ADULT NURSE NOTE - CHIEF COMPLAINT QUOTE
I just got out of detox, they said I tested positive for TB and to come to the hospital for a chest x-ray - patient      Patient was in detox for heroin in [place] called [facility], has paperwork with lab results

## 2020-11-17 LAB
ALBUMIN SERPL ELPH-MCNC: 4 G/DL — SIGNIFICANT CHANGE UP (ref 3.5–5.2)
ALBUMIN SERPL ELPH-MCNC: 4.7 G/DL — SIGNIFICANT CHANGE UP (ref 3.5–5.2)
ALP SERPL-CCNC: 102 U/L — SIGNIFICANT CHANGE UP (ref 30–115)
ALP SERPL-CCNC: 91 U/L — SIGNIFICANT CHANGE UP (ref 30–115)
ALT FLD-CCNC: 30 U/L — SIGNIFICANT CHANGE UP (ref 0–41)
ALT FLD-CCNC: 34 U/L — SIGNIFICANT CHANGE UP (ref 0–41)
ANION GAP SERPL CALC-SCNC: 12 MMOL/L — SIGNIFICANT CHANGE UP (ref 7–14)
ANION GAP SERPL CALC-SCNC: 12 MMOL/L — SIGNIFICANT CHANGE UP (ref 7–14)
AST SERPL-CCNC: 33 U/L — SIGNIFICANT CHANGE UP (ref 0–41)
AST SERPL-CCNC: 38 U/L — SIGNIFICANT CHANGE UP (ref 0–41)
BASOPHILS # BLD AUTO: 0.03 K/UL — SIGNIFICANT CHANGE UP (ref 0–0.2)
BASOPHILS # BLD AUTO: 0.04 K/UL — SIGNIFICANT CHANGE UP (ref 0–0.2)
BASOPHILS NFR BLD AUTO: 0.3 % — SIGNIFICANT CHANGE UP (ref 0–1)
BASOPHILS NFR BLD AUTO: 0.4 % — SIGNIFICANT CHANGE UP (ref 0–1)
BILIRUB SERPL-MCNC: 0.5 MG/DL — SIGNIFICANT CHANGE UP (ref 0.2–1.2)
BILIRUB SERPL-MCNC: 0.6 MG/DL — SIGNIFICANT CHANGE UP (ref 0.2–1.2)
BUN SERPL-MCNC: 19 MG/DL — SIGNIFICANT CHANGE UP (ref 10–20)
BUN SERPL-MCNC: 23 MG/DL — HIGH (ref 10–20)
CALCIUM SERPL-MCNC: 10.2 MG/DL — HIGH (ref 8.5–10.1)
CALCIUM SERPL-MCNC: 9 MG/DL — SIGNIFICANT CHANGE UP (ref 8.5–10.1)
CHLORIDE SERPL-SCNC: 100 MMOL/L — SIGNIFICANT CHANGE UP (ref 98–110)
CHLORIDE SERPL-SCNC: 102 MMOL/L — SIGNIFICANT CHANGE UP (ref 98–110)
CO2 SERPL-SCNC: 25 MMOL/L — SIGNIFICANT CHANGE UP (ref 17–32)
CO2 SERPL-SCNC: 27 MMOL/L — SIGNIFICANT CHANGE UP (ref 17–32)
CREAT SERPL-MCNC: 1 MG/DL — SIGNIFICANT CHANGE UP (ref 0.7–1.5)
CREAT SERPL-MCNC: 1.7 MG/DL — HIGH (ref 0.7–1.5)
EOSINOPHIL # BLD AUTO: 0.04 K/UL — SIGNIFICANT CHANGE UP (ref 0–0.7)
EOSINOPHIL # BLD AUTO: 0.23 K/UL — SIGNIFICANT CHANGE UP (ref 0–0.7)
EOSINOPHIL NFR BLD AUTO: 0.3 % — SIGNIFICANT CHANGE UP (ref 0–8)
EOSINOPHIL NFR BLD AUTO: 2.8 % — SIGNIFICANT CHANGE UP (ref 0–8)
GLUCOSE SERPL-MCNC: 204 MG/DL — HIGH (ref 70–99)
GLUCOSE SERPL-MCNC: 88 MG/DL — SIGNIFICANT CHANGE UP (ref 70–99)
HCT VFR BLD CALC: 37.5 % — LOW (ref 42–52)
HCT VFR BLD CALC: 45.4 % — SIGNIFICANT CHANGE UP (ref 42–52)
HGB BLD-MCNC: 11.9 G/DL — LOW (ref 14–18)
HGB BLD-MCNC: 14.4 G/DL — SIGNIFICANT CHANGE UP (ref 14–18)
IMM GRANULOCYTES NFR BLD AUTO: 0.4 % — HIGH (ref 0.1–0.3)
IMM GRANULOCYTES NFR BLD AUTO: 0.5 % — HIGH (ref 0.1–0.3)
LYMPHOCYTES # BLD AUTO: 1.21 K/UL — SIGNIFICANT CHANGE UP (ref 1.2–3.4)
LYMPHOCYTES # BLD AUTO: 1.56 K/UL — SIGNIFICANT CHANGE UP (ref 1.2–3.4)
LYMPHOCYTES # BLD AUTO: 19.1 % — LOW (ref 20.5–51.1)
LYMPHOCYTES # BLD AUTO: 8.1 % — LOW (ref 20.5–51.1)
MAGNESIUM SERPL-MCNC: 2.1 MG/DL — SIGNIFICANT CHANGE UP (ref 1.8–2.4)
MCHC RBC-ENTMCNC: 27.4 PG — SIGNIFICANT CHANGE UP (ref 27–31)
MCHC RBC-ENTMCNC: 27.5 PG — SIGNIFICANT CHANGE UP (ref 27–31)
MCHC RBC-ENTMCNC: 31.7 G/DL — LOW (ref 32–37)
MCHC RBC-ENTMCNC: 31.7 G/DL — LOW (ref 32–37)
MCV RBC AUTO: 86.5 FL — SIGNIFICANT CHANGE UP (ref 80–94)
MCV RBC AUTO: 86.8 FL — SIGNIFICANT CHANGE UP (ref 80–94)
MONOCYTES # BLD AUTO: 0.68 K/UL — HIGH (ref 0.1–0.6)
MONOCYTES # BLD AUTO: 1.02 K/UL — HIGH (ref 0.1–0.6)
MONOCYTES NFR BLD AUTO: 6.9 % — SIGNIFICANT CHANGE UP (ref 1.7–9.3)
MONOCYTES NFR BLD AUTO: 8.3 % — SIGNIFICANT CHANGE UP (ref 1.7–9.3)
NEUTROPHILS # BLD AUTO: 12.48 K/UL — HIGH (ref 1.4–6.5)
NEUTROPHILS # BLD AUTO: 5.64 K/UL — SIGNIFICANT CHANGE UP (ref 1.4–6.5)
NEUTROPHILS NFR BLD AUTO: 69 % — SIGNIFICANT CHANGE UP (ref 42.2–75.2)
NEUTROPHILS NFR BLD AUTO: 83.9 % — HIGH (ref 42.2–75.2)
NRBC # BLD: 0 /100 WBCS — SIGNIFICANT CHANGE UP (ref 0–0)
NRBC # BLD: 0 /100 WBCS — SIGNIFICANT CHANGE UP (ref 0–0)
PLATELET # BLD AUTO: 230 K/UL — SIGNIFICANT CHANGE UP (ref 130–400)
PLATELET # BLD AUTO: 304 K/UL — SIGNIFICANT CHANGE UP (ref 130–400)
POTASSIUM SERPL-MCNC: 4.1 MMOL/L — SIGNIFICANT CHANGE UP (ref 3.5–5)
POTASSIUM SERPL-MCNC: 5.1 MMOL/L — HIGH (ref 3.5–5)
POTASSIUM SERPL-SCNC: 4.1 MMOL/L — SIGNIFICANT CHANGE UP (ref 3.5–5)
POTASSIUM SERPL-SCNC: 5.1 MMOL/L — HIGH (ref 3.5–5)
PROT SERPL-MCNC: 6.1 G/DL — SIGNIFICANT CHANGE UP (ref 6–8)
PROT SERPL-MCNC: 7.4 G/DL — SIGNIFICANT CHANGE UP (ref 6–8)
RAPID RVP RESULT: SIGNIFICANT CHANGE UP
RBC # BLD: 4.32 M/UL — LOW (ref 4.7–6.1)
RBC # BLD: 5.25 M/UL — SIGNIFICANT CHANGE UP (ref 4.7–6.1)
RBC # FLD: 13.3 % — SIGNIFICANT CHANGE UP (ref 11.5–14.5)
RBC # FLD: 13.4 % — SIGNIFICANT CHANGE UP (ref 11.5–14.5)
SARS-COV-2 RNA SPEC QL NAA+PROBE: SIGNIFICANT CHANGE UP
SODIUM SERPL-SCNC: 137 MMOL/L — SIGNIFICANT CHANGE UP (ref 135–146)
SODIUM SERPL-SCNC: 141 MMOL/L — SIGNIFICANT CHANGE UP (ref 135–146)
TROPONIN T SERPL-MCNC: 0.01 NG/ML — SIGNIFICANT CHANGE UP
WBC # BLD: 14.86 K/UL — HIGH (ref 4.8–10.8)
WBC # BLD: 8.17 K/UL — SIGNIFICANT CHANGE UP (ref 4.8–10.8)
WBC # FLD AUTO: 14.86 K/UL — HIGH (ref 4.8–10.8)
WBC # FLD AUTO: 8.17 K/UL — SIGNIFICANT CHANGE UP (ref 4.8–10.8)

## 2020-11-17 PROCEDURE — 99408 AUDIT/DAST 15-30 MIN: CPT | Mod: GC

## 2020-11-17 PROCEDURE — 99223 1ST HOSP IP/OBS HIGH 75: CPT | Mod: GC,25

## 2020-11-17 PROCEDURE — 71045 X-RAY EXAM CHEST 1 VIEW: CPT | Mod: 26

## 2020-11-17 RX ORDER — AMLODIPINE BESYLATE 2.5 MG/1
10 TABLET ORAL DAILY
Refills: 0 | Status: DISCONTINUED | OUTPATIENT
Start: 2020-11-17 | End: 2020-11-19

## 2020-11-17 RX ORDER — ASPIRIN/CALCIUM CARB/MAGNESIUM 324 MG
81 TABLET ORAL DAILY
Refills: 0 | Status: DISCONTINUED | OUTPATIENT
Start: 2020-11-17 | End: 2020-11-19

## 2020-11-17 RX ORDER — SODIUM CHLORIDE 9 MG/ML
1000 INJECTION INTRAMUSCULAR; INTRAVENOUS; SUBCUTANEOUS
Refills: 0 | Status: DISCONTINUED | OUTPATIENT
Start: 2020-11-17 | End: 2020-11-17

## 2020-11-17 RX ORDER — ATORVASTATIN CALCIUM 80 MG/1
10 TABLET, FILM COATED ORAL AT BEDTIME
Refills: 0 | Status: DISCONTINUED | OUTPATIENT
Start: 2020-11-17 | End: 2020-11-19

## 2020-11-17 RX ORDER — PANTOPRAZOLE SODIUM 20 MG/1
40 TABLET, DELAYED RELEASE ORAL
Refills: 0 | Status: DISCONTINUED | OUTPATIENT
Start: 2020-11-17 | End: 2020-11-19

## 2020-11-17 RX ORDER — HEPARIN SODIUM 5000 [USP'U]/ML
5000 INJECTION INTRAVENOUS; SUBCUTANEOUS EVERY 12 HOURS
Refills: 0 | Status: DISCONTINUED | OUTPATIENT
Start: 2020-11-17 | End: 2020-11-18

## 2020-11-17 RX ADMIN — HEPARIN SODIUM 5000 UNIT(S): 5000 INJECTION INTRAVENOUS; SUBCUTANEOUS at 05:39

## 2020-11-17 RX ADMIN — AMLODIPINE BESYLATE 10 MILLIGRAM(S): 2.5 TABLET ORAL at 05:39

## 2020-11-17 RX ADMIN — SODIUM CHLORIDE 50 MILLILITER(S): 9 INJECTION INTRAMUSCULAR; INTRAVENOUS; SUBCUTANEOUS at 05:39

## 2020-11-17 RX ADMIN — Medication 81 MILLIGRAM(S): at 12:11

## 2020-11-17 RX ADMIN — ATORVASTATIN CALCIUM 10 MILLIGRAM(S): 80 TABLET, FILM COATED ORAL at 22:52

## 2020-11-17 RX ADMIN — PANTOPRAZOLE SODIUM 40 MILLIGRAM(S): 20 TABLET, DELAYED RELEASE ORAL at 05:39

## 2020-11-17 NOTE — H&P ADULT - HISTORY OF PRESENT ILLNESS
60M with PMHx of insulin dependent DM, HCV, polysubstance abuse (active heroin and cocaine user), HTN, HLD, GERD presents to ED for positive quantiferon gold. The patient has history of being incarcerated. The patient reports _________ and increased sweating the last week.  Denies fever/chills, weight loss/gain, chest pain, hemoptysis, abd pain, nausea, vomiting, headache, weakness, dizziness, all other complaints. No recent travel out of state/country, no sick contacts.     ED Vitals : 35.9 C, HR: 74, BP: 114/69 RR: 17 , SpO2: 100%  CXR: negative for acute disease   Labs: WBC 15K , Cr 1.7      60M with PMHx of insulin dependent DM, HCV, polysubstance abuse (active heroin and cocaine user), HTN, HLD, GERD presents to ED for positive quantiferon gold. Reports feeling more fatigued and reports shortness of breath and sweating. Admits to using heroin and cocaine 2 days ago.  The patient has history of being incarcerated. Denies fever/chills, weight loss/gain, chest pain, hemoptysis, abdominal pain, nausea, vomiting, headache, weakness, dizziness. No recent travel out of state/country, no sick contacts.     ED Vitals : 35.9 C, HR: 74, BP: 114/69 RR: 17 , SpO2: 100%  CXR: negative for acute disease   Labs: WBC 15K , Cr 1.7

## 2020-11-17 NOTE — H&P ADULT - NSHPLABSRESULTS_GEN_ALL_CORE
14.4   14.86 )-----------( 304      ( 17 Nov 2020 00:15 )             45.4     11-16-20 @ 23:47    141  |  102  |  19             --------------------------< 88     5.1<H>  |  27  | 1.7<H>    eGFR AA: 50<L>  eGFR N-AA: 43<L>    Calcium: 10.2<H>  Phosphorus: --  Magnesium: --    AST: 38    ALT: 34  AlkPhos: 102  Protein: 7.4  Albumin: 4.7  TBili: 0.6  D-Bili: --    < from: Xray Chest 1 View-PORTABLE IMMEDIATE (Xray Chest 1 View-PORTABLE IMMEDIATE .) (11.17.20 @ 00:48) >        IMPRESSION:      No focal lung opacities, pleural effusions, or pneumothorax. No radiographic evidence of tuberculosis infection.    < end of copied text > 14.4   14.86 )-----------( 304      ( 17 Nov 2020 00:15 )             45.4     11-16-20 @ 23:47    141  |  102  |  19             --------------------------< 88     5.1<H>  |  27  | 1.7<H>    eGFR AA: 50<L>  eGFR N-AA: 43<L>    Calcium: 10.2<H>  Phosphorus: --  Magnesium: --    AST: 38    ALT: 34  AlkPhos: 102  Protein: 7.4  Albumin: 4.7  TBili: 0.6  D-Bili: --    < from: Xray Chest 1 View-PORTABLE IMMEDIATE (Xray Chest 1 View-PORTABLE IMMEDIATE .) (11.17.20 @ 00:48) >    IMPRESSION:      No focal lung opacities, pleural effusions, or pneumothorax. No radiographic evidence of tuberculosis infection.    < end of copied text >

## 2020-11-17 NOTE — H&P ADULT - ATTENDING COMMENTS
HPI: 60/M with insulin dependent DM, HCV, polysubstance abuse (active heroin and cocaine user), HTN, HLD, GERD sent to ed from drug rehab for positive QuantiFeron gold. Reports being in detox center over last 10 days where he was on tapering dose of Suboxone, also reports using cocaine and heroin in detox center as he had it with him. Reports feeling shortness of breath and sweating. Denies nausea, vomiting, diarrhea, abdominal pain, chest pain, palpitations, dizziness, headache, wheezing, joint pain or swelling, fever, chills.     Vital Signs Last 24 Hrs  T(C): 36.9 (17 Nov 2020 05:36), Max: 36.9 (17 Nov 2020 05:36)  T(F): 98.5 (17 Nov 2020 05:36), Max: 98.5 (17 Nov 2020 05:36)  HR: 64 (17 Nov 2020 05:36) (64 - 94)  BP: 111/70 (17 Nov 2020 05:36) (111/70 - 118/74)  RR: 15 (17 Nov 2020 05:36) (15 - 17)  SpO2: 99% (17 Nov 2020 05:36) (98% - 100%)    · Constitutional	Well-developed, well nourished  · Eyes	conjuctivae clear  · ENMT	No oral lesions; no gross abnormalities  · Neck	No bruits; no thyromegaly or nodules   · Back	No deformity or limitation of movement   · Respiratory	Breath Sounds equal & clear to percussion & auscultation, no accessory muscle use  · Cardiovascular	Regular rate & rhythm, normal S1, S2; no murmurs, gallops or rubs; no S3, S4  · Gastrointestinal	Soft, non-tender, no hepatosplenomegaly, normal bowel sounds  · Extremities	No cyanosis, clubbing or edema, multiple injection site marks   · Neurological	Alert & oriented; no sensory, motor or coordination deficits, normal reflexes  · Musculoskeletal	No joint pain, swelling or deformity; no limitation of movement                 14.4   14.86 )-----------( 304      ( 17 Nov 2020 00:15 )             45.4       11-16    141  |  102  |  19  ----------------------------<  88  5.1<H>   |  27  |  1.7<H>    Ca    10.2<H>      16 Nov 2020 23:47    TPro  7.4  /  Alb  4.7  /  TBili  0.6  /  DBili  x   /  AST  38  /  ALT  34  /  AlkPhos  102  11-16    Lactate Trend    CARDIAC MARKERS ( 16 Nov 2020 23:47 )  x     / 0.01 ng/mL / x     / x     / x        CAPILLARY BLOOD GLUCOSE  POCT Blood Glucose.: 92 mg/dL (16 Nov 2020 23:53)    Plan:   1. QuantiFeron positive - latent TB vs active TB infection   2. Polysubstance use - recent cocaine and heroin use   3. ADOLFO   4. DM   5. HTN   6. HLD    - sent from detox center for QuantiFeron positive  - chest xray - clear   - reports shortness of breath and sweating - more likely from withdrawal symptoms   - vitals stable at this time   - more likely latent TB but given symptoms will obtain AFB   - occasionally brings up sputum   - ID consult   - recently tapered off from suboxone in detox center   - last heroin and cocaine use 2 days back   - monitor CIWA   - counselled for substance use and resources   - SBRIT consulted   - Cr level of 1.7 on admission - likely pre-renal   - trend BMP   - on lantus 35 HS, humalog 5 BID and metformin at home   - BS 88 in bmp   - will start on sliding scale ACHS - start on basal bolus insulin if needed   - follow A1c level   - target -180  - continue aspirin, amlodipine and atorvastatin   - holding lisinopril for ADOLFO

## 2020-11-17 NOTE — H&P ADULT - NSICDXFAMILYHX_GEN_ALL_CORE_FT
FAMILY HISTORY:  Father  Still living? Unknown  Family history of heart attack, Age at diagnosis: Age Unknown

## 2020-11-17 NOTE — H&P ADULT - NSICDXPASTMEDICALHX_GEN_ALL_CORE_FT
PAST MEDICAL HISTORY:  Diabetes mellitus     GERD (gastroesophageal reflux disease)     Hepatitis C     HTN (hypertension)     Hyperlipidemia

## 2020-11-17 NOTE — H&P ADULT - ASSESSMENT
60M with PMHx of insulin dependent DM, HCV, polysubstance abuse (active heroin and cocaine user), HTN, HLD, GERD presents to ED for positive quantiferon gold ; patient reports shortness of breath and sweating     # Positive QuantiFeron gold , likely secondary to latent TB  - h/o being incarcerated  - no weight loss/ cough  but reports shortness of breath and sweating  - CXR negative for acute disease  - f/u Sputum Acid Fast Culture  - ID consult    # ADOLFO   - Cr 1.7 , baseline 1   - likely secondary to pre-renal cause  - trend Cr , if not improving get U/S Renal  - avoid nephrotoxic agents    # h/o DM  - hold home medications  - F/S AC QHS , if > 180, start insulin  - lipid profile, HbA1c  - carb consistent diet   - c/w statin , asa    # h/o HTN , controlled  - c/w amlodipine  - hold lisinopril due to renal insufficiency     # h/o HCV     #DVT PPX: heparin subq  # GI PPX: PPI  #Activity: as tolerated  # diet: carn consistent    FULL CODE 60M with PMHx of insulin dependent DM, HCV, polysubstance abuse (active heroin and cocaine user), HTN, HLD, GERD presents to ED for positive quantiferon gold ; patient reports shortness of breath and sweating     # Positive QuantiFeron gold , secondary to latent TB  - h/o being incarcerated   - HIV negative  - no weight loss/ cough  but reports shortness of breath and sweating  - CXR negative for acute disease  - f/u Sputum Acid Fast Culture  - ID consult    # Shortness of breath / sweating/ fatigue   - likely secondary to heroin/cocaine withdrawal ; r/o infectious cause   - patient has non labored breathing, speaking in full sentences and saturating 100% on room air  - WBC 15K , afebrile , not tachy - no sepsis present on admission  - if febrile, send blood cultures and start abx  - f/u RVP  , f/u acid fast tb sputum   -SBIRT consult     # ADOLFO   - Cr 1.7 , baseline 1   - likely secondary to pre-renal cause due to decreased po intake and recent drug use   - trend Cr , if not improving get U/S Renal  - avoid nephrotoxic agents  - IVF     # Polysubstance abuse , last used heroine and cocaine 2 days ago as per patient , monitor for signs of withdrawal      # h/o DM  - hold home medications  - F/S AC QHS , if > 180, start insulin  - lipid profile, HbA1c  - carb consistent diet   - c/w statin , asa    # h/o HTN , controlled  - c/w amlodipine  - hold lisinopril due to renal insufficiency     # h/o HCV , treated     #DVT PPX: heparin subq  #GI PPX: PPI  #Activity: as tolerated  # diet: carn consistent    FULL CODE

## 2020-11-17 NOTE — H&P ADULT - NSHPPHYSICALEXAM_GEN_ALL_CORE
Vital Signs (24 Hrs):  T(C): 35.9 (11-16-20 @ 22:56), Max: 35.9 (11-16-20 @ 22:56)  HR: 74 (11-17-20 @ 02:20) (74 - 94)  BP: 114/69 (11-17-20 @ 02:20) (114/69 - 118/74)  RR: 17 (11-17-20 @ 02:20) (17 - 17)  SpO2: 100% (11-17-20 @ 02:20) (98% - 100%)    PHYSICAL EXAM:  GENERAL: NAD, lying in bed comfortably  HEAD:  Atraumatic, Normocephalic  EYES: EOMI, PERRLA, conjunctiva and sclera clear  ENT: Moist mucous membranes  NECK: Supple, No JVD  CHEST/LUNG: Clear to auscultation bilaterally; No rales, rhonchi, wheezing, or rubs. Unlabored respirations  HEART: Regular rate and rhythm; No murmurs, rubs, or gallops  ABDOMEN: Bowel sounds present; Soft, Nontender, Nondistended. No hepatomegally  EXTREMITIES:  2+ Peripheral Pulses, brisk capillary refill. No clubbing, cyanosis, or edema  NERVOUS SYSTEM:  Alert & Oriented X3, speech clear. No deficits   MSK: FROM all 4 extremities, full and equal strength  SKIN: No rashes or lesions

## 2020-11-17 NOTE — CONSULT NOTE ADULT - SUBJECTIVE AND OBJECTIVE BOX
HENRIQUEBEREKET  60y, Male  Allergy: No Known Allergies      CHIEF COMPLAINT: positive quantiferon gold test (17 Nov 2020 03:09)      HPI:  60yMale with a past medical history of insulin dependent DM, HCV, polysubstance abuse (active heroin and cocaine user), HTN, HLD, GERD presents to ED for positive quantiferon gold. Reports feeling more fatigued and reports shortness of breath and sweating. Admits to using heroin and cocaine 2 days ago.  The patient has history of being incarcerated. Denies fever/chills, weight loss/gain, chest pain, hemoptysis, abdominal pain, nausea, vomiting, headache, weakness, dizziness. No recent travel out of state/country, no sick contacts.     ED Vitals : 35.9 C, HR: 74, BP: 114/69 RR: 17 , SpO2: 100%  CXR: negative for acute disease   Labs: WBC 15K , Cr 1.7       Infectious Diseases History:  Old Micro Data/Cultures: None    FAMILY HISTORY:  Family history of heart attack (Father)      PAST MEDICAL & SURGICAL HISTORY:  GERD (gastroesophageal reflux disease)    Hyperlipidemia    Diabetes mellitus    Hepatitis C    HTN (hypertension)    No significant past surgical history        SOCIAL HISTORY  Social History:  h/o incarceration  active cocaine and heroin user (17 Nov 2020 03:09)      Recent Travel:  Other Exposures:     ROS  General: Reports occasional increased sweating in axillary and inguinal area. Denies rigors  HEENT: Denies headache, rhinorrhea, sore throat, eye pain  CV: Denies CP, palpitations  PULM: Denies wheezing, hemoptysis  GI: Denies hematemesis, hematochezia, melena  : Denies discharge, hematuria  MSK: Denies arthralgias, myalgias  SKIN: Denies rash, lesions  NEURO: Denies paresthesias, weakness  PSYCH: Denies depression, anxiety    VITALS:  T(F): 98.5, Max: 98.5 (11-17-20 @ 05:36)  HR: 64  BP: 111/70  RR: 15Vital Signs Last 24 Hrs  T(C): 36.9 (17 Nov 2020 05:36), Max: 36.9 (17 Nov 2020 05:36)  T(F): 98.5 (17 Nov 2020 05:36), Max: 98.5 (17 Nov 2020 05:36)  HR: 64 (17 Nov 2020 05:36) (64 - 94)  BP: 111/70 (17 Nov 2020 05:36) (111/70 - 118/74)  BP(mean): --  RR: 15 (17 Nov 2020 05:36) (15 - 17)  SpO2: 99% (17 Nov 2020 05:36) (98% - 100%)    PHYSICAL EXAM:  Gen: NAD, resting in bed  HEENT: Normocephalic, atraumatic  Neck: supple, no lymphadenopathy  CV: Regular rate & regular rhythm  Lungs: CTAB  Abdomen: Soft, BS present  Ext: Warm, well perfused  Neuro: non focal, awake  Skin: no rash, no lesions  Lines: no phlebitis    TESTS & MEASUREMENTS:                        11.9   8.17  )-----------( 230      ( 17 Nov 2020 11:20 )             37.5     11-17    137  |  100  |  23<H>  ----------------------------<  204<H>  4.1   |  25  |  1.0    Ca    9.0      17 Nov 2020 11:20  Mg     2.1     11-17    TPro  6.1  /  Alb  4.0  /  TBili  0.5  /  DBili  x   /  AST  33  /  ALT  30  /  AlkPhos  91  11-17    eGFR if Non African American: 83 mL/min/1.73M2 (11-17-20 @ 11:20)  eGFR if : 96 mL/min/1.73M2 (11-17-20 @ 11:20)  eGFR if Non African American: 43 mL/min/1.73M2 (11-16-20 @ 23:47)  eGFR if : 50 mL/min/1.73M2 (11-16-20 @ 23:47)    LIVER FUNCTIONS - ( 17 Nov 2020 11:20 )  Alb: 4.0 g/dL / Pro: 6.1 g/dL / ALK PHOS: 91 U/L / ALT: 30 U/L / AST: 33 U/L / GGT: x                     INFECTIOUS DISEASES TESTING  HIV-1/2 Combo Result: Nonreact (06-08-20 @ 11:50)  Hepatitis B Surface Antigen: Nonreact (06-08-20 @ 11:49)      RADIOLOGY & ADDITIONAL TESTS:  < from: Xray Chest 1 View-PORTABLE IMMEDIATE (Xray Chest 1 View-PORTABLE IMMEDIATE .) (11.17.20 @ 00:48) >  IMPRESSION:      No focal lung opacities, pleural effusions, or pneumothorax. No radiographic evidence of tuberculosis infection.      < end of copied text >      CARDIOLOGY TESTING  12 Lead ECG:   Ventricular Rate 80 BPM    Atrial Rate 80 BPM    P-R Interval 162 ms    QRS Duration 88 ms    Q-T Interval 352 ms    QTC Calculation(Bazett) 405 ms    P Axis 52 degrees    R Axis 32 degrees    T Axis 26 degrees    Diagnosis Line Normal sinus rhythm  Normal ECG    Confirmed by JOVANI GODINEZ MD ([id number]) on 11/17/2020 7:10:39 AM (11-16-20 @ 23:58)      All available historical records have been reviewed    MEDICATIONS  amLODIPine   Tablet 10  aspirin enteric coated 81  atorvastatin 10  heparin   Injectable 5000  pantoprazole    Tablet 40      ANTIBIOTICS:      All available historical data has been reviewed

## 2020-11-17 NOTE — CONSULT NOTE ADULT - ASSESSMENT
ASSESSMENT  60yMale with a PMH of insulin dependent DM, HCV, polysubstance abuse (active heroin and cocaine user), HTN, HLD, GERD presents to ED for positive quantiferon gold. Reports feeling more fatigued and reports shortness of breath and sweating. Admits to using heroin and cocaine 2 days ago.  The patient has history of being incarcerated and recent release May 2020 after serving 5 years. Denies fever/chills, weight loss/gain, chest pain, hemoptysis, abdominal pain, nausea, vomiting, headache, weakness, dizziness. No recent travel out of state/country, no sick contacts.     IMPRESSION  # Unlikely active TB infection due to patient's clinical presentation with no recent history of fever, chills, cough or respiratory symptoms. Chest x-ray 11/17 is unremarkable with no evidence of tuberculosis infection.    RECOMMENDATIONS  - PPD outpatient. If >10mm then INH with vitamin B6 for 9 months  - Do not collect sputum for AFB  - d/c isolation precaution   ASSESSMENT  60yMale with a PMH of insulin dependent DM, HCV, polysubstance abuse (active heroin and cocaine user), HTN, HLD, GERD presents to ED for positive quantiferon gold. Reports feeling more fatigued and reports shortness of breath and sweating. Admits to using heroin and cocaine 2 days ago.  The patient has history of being incarcerated and recent release May 2020 after serving 5 years. Denies fever/chills, weight loss/gain, chest pain, hemoptysis, abdominal pain, nausea, vomiting, headache, weakness, dizziness. No recent travel out of state/country, no sick contacts.     IMPRESSION  #No evidence of active TB infection due to patient's clinical presentation with no recent history of fever, chills, cough or respiratory symptoms. Chest x-ray 11/17 is unremarkable with no evidence of tuberculosis infection.    RECOMMENDATIONS  - PPD outpatient. If >10mm then INH with vitamin B6 for 9 months  - Do not collect sputum for AFB  - d/c isolation precaution  -d/c pt home  recall prn please

## 2020-11-18 LAB
A1C WITH ESTIMATED AVERAGE GLUCOSE RESULT: 7.6 % — HIGH (ref 4–5.6)
ALBUMIN SERPL ELPH-MCNC: 4.1 G/DL — SIGNIFICANT CHANGE UP (ref 3.5–5.2)
ALP SERPL-CCNC: 90 U/L — SIGNIFICANT CHANGE UP (ref 30–115)
ALT FLD-CCNC: 35 U/L — SIGNIFICANT CHANGE UP (ref 0–41)
ANION GAP SERPL CALC-SCNC: 10 MMOL/L — SIGNIFICANT CHANGE UP (ref 7–14)
AST SERPL-CCNC: 30 U/L — SIGNIFICANT CHANGE UP (ref 0–41)
BILIRUB SERPL-MCNC: 0.7 MG/DL — SIGNIFICANT CHANGE UP (ref 0.2–1.2)
BUN SERPL-MCNC: 12 MG/DL — SIGNIFICANT CHANGE UP (ref 10–20)
CALCIUM SERPL-MCNC: 9.1 MG/DL — SIGNIFICANT CHANGE UP (ref 8.5–10.1)
CHLORIDE SERPL-SCNC: 100 MMOL/L — SIGNIFICANT CHANGE UP (ref 98–110)
CHOLEST SERPL-MCNC: 144 MG/DL — SIGNIFICANT CHANGE UP
CO2 SERPL-SCNC: 28 MMOL/L — SIGNIFICANT CHANGE UP (ref 17–32)
CREAT SERPL-MCNC: 0.7 MG/DL — SIGNIFICANT CHANGE UP (ref 0.7–1.5)
ESTIMATED AVERAGE GLUCOSE: 171 MG/DL — HIGH (ref 68–114)
GLUCOSE BLDC GLUCOMTR-MCNC: 105 MG/DL — HIGH (ref 70–99)
GLUCOSE BLDC GLUCOMTR-MCNC: 134 MG/DL — HIGH (ref 70–99)
GLUCOSE BLDC GLUCOMTR-MCNC: 135 MG/DL — HIGH (ref 70–99)
GLUCOSE SERPL-MCNC: 109 MG/DL — HIGH (ref 70–99)
HCT VFR BLD CALC: 42.5 % — SIGNIFICANT CHANGE UP (ref 42–52)
HCV AB S/CO SERPL IA: 86.02 COI — HIGH
HCV AB SERPL-IMP: REACTIVE
HDLC SERPL-MCNC: 37 MG/DL — LOW
HGB BLD-MCNC: 13.4 G/DL — LOW (ref 14–18)
LIPID PNL WITH DIRECT LDL SERPL: 89 MG/DL — SIGNIFICANT CHANGE UP
MCHC RBC-ENTMCNC: 27.2 PG — SIGNIFICANT CHANGE UP (ref 27–31)
MCHC RBC-ENTMCNC: 31.5 G/DL — LOW (ref 32–37)
MCV RBC AUTO: 86.2 FL — SIGNIFICANT CHANGE UP (ref 80–94)
NIGHT BLUE STAIN TISS: SIGNIFICANT CHANGE UP
NON HDL CHOLESTEROL: 107 MG/DL — SIGNIFICANT CHANGE UP
NRBC # BLD: 0 /100 WBCS — SIGNIFICANT CHANGE UP (ref 0–0)
PLATELET # BLD AUTO: 229 K/UL — SIGNIFICANT CHANGE UP (ref 130–400)
POTASSIUM SERPL-MCNC: 4.5 MMOL/L — SIGNIFICANT CHANGE UP (ref 3.5–5)
POTASSIUM SERPL-SCNC: 4.5 MMOL/L — SIGNIFICANT CHANGE UP (ref 3.5–5)
PROT SERPL-MCNC: 6.4 G/DL — SIGNIFICANT CHANGE UP (ref 6–8)
RBC # BLD: 4.93 M/UL — SIGNIFICANT CHANGE UP (ref 4.7–6.1)
RBC # FLD: 12.9 % — SIGNIFICANT CHANGE UP (ref 11.5–14.5)
SODIUM SERPL-SCNC: 138 MMOL/L — SIGNIFICANT CHANGE UP (ref 135–146)
SPECIMEN SOURCE: SIGNIFICANT CHANGE UP
TRIGL SERPL-MCNC: 143 MG/DL — SIGNIFICANT CHANGE UP
WBC # BLD: 4.75 K/UL — LOW (ref 4.8–10.8)
WBC # FLD AUTO: 4.75 K/UL — LOW (ref 4.8–10.8)

## 2020-11-18 PROCEDURE — 99233 SBSQ HOSP IP/OBS HIGH 50: CPT

## 2020-11-18 PROCEDURE — 93010 ELECTROCARDIOGRAM REPORT: CPT

## 2020-11-18 RX ORDER — ENOXAPARIN SODIUM 100 MG/ML
40 INJECTION SUBCUTANEOUS AT BEDTIME
Refills: 0 | Status: DISCONTINUED | OUTPATIENT
Start: 2020-11-18 | End: 2020-11-19

## 2020-11-18 RX ORDER — INFLUENZA VIRUS VACCINE 15; 15; 15; 15 UG/.5ML; UG/.5ML; UG/.5ML; UG/.5ML
0.5 SUSPENSION INTRAMUSCULAR ONCE
Refills: 0 | Status: DISCONTINUED | OUTPATIENT
Start: 2020-11-18 | End: 2020-11-19

## 2020-11-18 RX ORDER — LISINOPRIL 2.5 MG/1
20 TABLET ORAL DAILY
Refills: 0 | Status: DISCONTINUED | OUTPATIENT
Start: 2020-11-18 | End: 2020-11-19

## 2020-11-18 RX ORDER — METHADONE HYDROCHLORIDE 40 MG/1
20 TABLET ORAL ONCE
Refills: 0 | Status: DISCONTINUED | OUTPATIENT
Start: 2020-11-18 | End: 2020-11-18

## 2020-11-18 RX ADMIN — METHADONE HYDROCHLORIDE 20 MILLIGRAM(S): 40 TABLET ORAL at 07:55

## 2020-11-18 RX ADMIN — Medication 2 MILLIGRAM(S): at 20:21

## 2020-11-18 RX ADMIN — ATORVASTATIN CALCIUM 10 MILLIGRAM(S): 80 TABLET, FILM COATED ORAL at 21:17

## 2020-11-18 RX ADMIN — Medication 2 MILLIGRAM(S): at 17:32

## 2020-11-18 RX ADMIN — ENOXAPARIN SODIUM 40 MILLIGRAM(S): 100 INJECTION SUBCUTANEOUS at 21:17

## 2020-11-18 RX ADMIN — PANTOPRAZOLE SODIUM 40 MILLIGRAM(S): 20 TABLET, DELAYED RELEASE ORAL at 06:36

## 2020-11-18 RX ADMIN — Medication 2 MILLIGRAM(S): at 11:09

## 2020-11-18 RX ADMIN — Medication 81 MILLIGRAM(S): at 10:59

## 2020-11-18 RX ADMIN — HEPARIN SODIUM 5000 UNIT(S): 5000 INJECTION INTRAVENOUS; SUBCUTANEOUS at 06:37

## 2020-11-18 RX ADMIN — LISINOPRIL 20 MILLIGRAM(S): 2.5 TABLET ORAL at 10:59

## 2020-11-18 RX ADMIN — AMLODIPINE BESYLATE 10 MILLIGRAM(S): 2.5 TABLET ORAL at 06:36

## 2020-11-18 RX ADMIN — Medication 1 MILLIGRAM(S): at 06:37

## 2020-11-18 RX ADMIN — Medication 2 MILLIGRAM(S): at 23:43

## 2020-11-18 NOTE — PROGRESS NOTE ADULT - ASSESSMENT
60M with PMHx of insulin dependent DM, HCV, polysubstance abuse (active heroin and cocaine user), HTN, HLD, GERD presents to ED for positive quantiferon gold ; patient reports shortness of breath and sweating     # Positive QuantiFeron gold , secondary to latent TB  - h/o being incarcerated   - HIV negative  -  no clinical symptoms, CXR negative for acute disease  - PPD outpatient. If >10mm then INH with vitamin B6 for 9 months    # fatigue  - likely secondary to heroin/cocaine withdrawal    - start Ciwa ativan PO  - f/u Bcx as hihg risk for I.E    # ADOLFO   - resolved, back to baseline 1  - likely secondary to pre-renal cause due to decreased po intake and recent drug use   - avoid nephrotoxic agents  - IVF     # Polysubstance abuse  - on ciwa for withdrawal       # h/o DM  - hold home medications  - F/S AC QHS , if > 180, start insulin  - c/w statin , asa    # h/o HTN , controlled  - c/w amlodipine  - resume lisinopril     # h/o HCV , treated     #DVT PPX: lovenox   #GI PPX: PPI  #Activity: as tolerated  # diet: carn consistent  #dispo: acute, drug withdrawal, from home   FULL CODE 60M with PMHx of insulin dependent DM, HCV, polysubstance abuse (active heroin and cocaine user), HTN, HLD, GERD presents to ED for positive quantiferon gold ; patient reports shortness of breath and sweating     # Positive QuantiFeron gold , secondary to latent TB  - h/o being incarcerated   - HIV negative  - no clinical symptoms, CXR negative for acute disease  - PPD outpatient. If >10mm then INH with vitamin B6 for 9 months    # fatigue  - likely secondary to heroin/cocaine withdrawal    - start Ciwa ativan PO  - f/u Bcx as high risk for I.E  - leukocytosis on admission resolved with no abx, exam not suspicious for any infection     # ADOLFO   - resolved, back to baseline 1  - likely secondary to pre-renal cause due to decreased po intake and recent drug use   - avoid nephrotoxic agents  - IVF     # Polysubstance abuse  - on ciwa for withdrawal       # h/o DM  - hold home medications  - F/S AC QHS , if > 180, start insulin  - c/w statin , asa    # h/o HTN , controlled  - c/w amlodipine  - resume lisinopril     # h/o HCV , treated     #DVT PPX: lovenox   #GI PPX: PPI  #Activity: as tolerated  # diet: carn consistent  #dispo: acute, drug withdrawal, from home   FULL CODE 60M with PMHx of insulin dependent DM, HCV, polysubstance abuse (active heroin and cocaine user), HTN, HLD, GERD presents to ED for positive quantiferon gold ; patient reports shortness of breath and sweating     # Positive QuantiFeron gold , secondary to latent TB  - h/o being incarcerated   - HIV negative  - no clinical symptoms, CXR negative for acute disease  - PPD outpatient. If >10mm then INH with vitamin B6 for 9 months    # fatigue  - likely secondary to heroin/cocaine withdrawal    - start Ciwa ativan PO  - f/u Bcx as high risk for I.E  - leukocytosis on admission resolved with no abx, exam not suspicious for any infection     # ADOLFO   - resolved, back to baseline   - likely secondary to pre-renal cause due to decreased po intake and recent drug use   - avoid nephrotoxic agents  - IVF     # Polysubstance abuse  - on ciwa for withdrawal       # h/o DM  - hold home medications  - F/S AC QHS , if > 180, start insulin  - c/w statin , asa    # h/o HTN , controlled  - c/w amlodipine  - resume lisinopril     # h/o HCV , treated     #DVT PPX: lovenox   #GI PPX: PPI  #Activity: as tolerated  # diet: carn consistent  #dispo: acute, drug withdrawal, from home   FULL CODE

## 2020-11-18 NOTE — PROGRESS NOTE ADULT - ATTENDING COMMENTS
Patient seen and examined independently. Agree with resident note/ history / physical exam and plan of care with following exceptions/additions/updates. Case discussed with patient/pt decision maker, house-staff and nursing.     ID notes appreciated, PPD and poss INH as outpt   now pt is in withdrawal, cont ciwa protocol     full code

## 2020-11-18 NOTE — PROGRESS NOTE ADULT - SUBJECTIVE AND OBJECTIVE BOX
Patient is a 60y old  Male who presents with a chief complaint of positive quantiferon gold test (17 Nov 2020 14:28)      OVERNIGHT EVENTS: anxious and has tremor this AM    SUBJECTIVE / INTERVAL HPI: Patient seen and examined at bedside.     VITAL SIGNS:  Vital Signs Last 24 Hrs  T(C): 37.2 (18 Nov 2020 06:30), Max: 37.2 (18 Nov 2020 06:30)  T(F): 98.9 (18 Nov 2020 06:30), Max: 98.9 (18 Nov 2020 06:30)  HR: 64 (18 Nov 2020 06:44) (64 - 74)  BP: 176/84 (18 Nov 2020 06:44) (115/59 - 183/81)  BP(mean): --  RR: 17 (18 Nov 2020 06:44) (16 - 20)  SpO2: 99% (18 Nov 2020 06:44) (98% - 100%)    PHYSICAL EXAM:    General: WDWN  HEENT: NC/AT; PERRL, clear conjunctiva  Neck: supple  Cardiovascular: +S1/S2; RRR  Respiratory: CTA b/l; no W/R/R  Gastrointestinal: soft, NT/ND; +BSx4  Extremities: WWP; 2+ peripheral pulses; no edema   Neurological: AAOx3; no focal deficits    MEDICATIONS:  MEDICATIONS  (STANDING):  amLODIPine   Tablet 10 milliGRAM(s) Oral daily  aspirin enteric coated 81 milliGRAM(s) Oral daily  atorvastatin 10 milliGRAM(s) Oral at bedtime  heparin   Injectable 5000 Unit(s) SubCutaneous every 12 hours  LORazepam     Tablet   Oral   LORazepam     Tablet 2 milliGRAM(s) Oral every 4 hours  pantoprazole    Tablet 40 milliGRAM(s) Oral before breakfast    MEDICATIONS  (PRN):      ALLERGIES:  Allergies    No Known Allergies    Intolerances        LABS:                        11.9   8.17  )-----------( 230      ( 17 Nov 2020 11:20 )             37.5     11-17    137  |  100  |  23<H>  ----------------------------<  204<H>  4.1   |  25  |  1.0    Ca    9.0      17 Nov 2020 11:20  Mg     2.1     11-17    TPro  6.1  /  Alb  4.0  /  TBili  0.5  /  DBili  x   /  AST  33  /  ALT  30  /  AlkPhos  91  11-17        CAPILLARY BLOOD GLUCOSE      POCT Blood Glucose.: 105 mg/dL (18 Nov 2020 06:51)    < from: Xray Chest 1 View-PORTABLE IMMEDIATE (Xray Chest 1 View-PORTABLE IMMEDIATE .) (11.17.20 @ 00:48) >  IMPRESSION:      No focal lung opacities, pleural effusions, or pneumothorax. No radiographic evidence of tuberculosis infection.    < end of copied text >   Patient is a 60y old  Male who presents with a chief complaint of positive quantiferon gold test (17 Nov 2020 14:28)      OVERNIGHT EVENTS: anxious and has tremor this AM    SUBJECTIVE / INTERVAL HPI: Patient seen and examined at bedside.     VITAL SIGNS:  Vital Signs Last 24 Hrs  T(C): 37.2 (18 Nov 2020 06:30), Max: 37.2 (18 Nov 2020 06:30)  T(F): 98.9 (18 Nov 2020 06:30), Max: 98.9 (18 Nov 2020 06:30)  HR: 64 (18 Nov 2020 06:44) (64 - 74)  BP: 176/84 (18 Nov 2020 06:44) (115/59 - 183/81)  BP(mean): --  RR: 17 (18 Nov 2020 06:44) (16 - 20)  SpO2: 99% (18 Nov 2020 06:44) (98% - 100%)    PHYSICAL EXAM:    General: WDWN  HEENT: NC/AT; PERRL, clear conjunctiva  Neck: supple  Cardiovascular: +S1/S2; RRR  Respiratory: CTA b/l; no W/R/R  Gastrointestinal: soft, NT/ND; +BSx4  Extremities: WWP; 2+ peripheral pulses; no edema   Neurological: AAOx3; no focal deficits    MEDICATIONS:  MEDICATIONS  (STANDING):  amLODIPine   Tablet 10 milliGRAM(s) Oral daily  aspirin enteric coated 81 milliGRAM(s) Oral daily  atorvastatin 10 milliGRAM(s) Oral at bedtime  heparin   Injectable 5000 Unit(s) SubCutaneous every 12 hours  LORazepam     Tablet   Oral   LORazepam     Tablet 2 milliGRAM(s) Oral every 4 hours  pantoprazole    Tablet 40 milliGRAM(s) Oral before breakfast    MEDICATIONS  (PRN):      ALLERGIES:  Allergies    No Known Allergies    Intolerances      LABS:                        11.9   8.17  )-----------( 230      ( 17 Nov 2020 11:20 )             37.5     11-17    137  |  100  |  23<H>  ----------------------------<  204<H>  4.1   |  25  |  1.0    Ca    9.0      17 Nov 2020 11:20  Mg     2.1     11-17    TPro  6.1  /  Alb  4.0  /  TBili  0.5  /  DBili  x   /  AST  33  /  ALT  30  /  AlkPhos  91  11-17        CAPILLARY BLOOD GLUCOSE      POCT Blood Glucose.: 105 mg/dL (18 Nov 2020 06:51)    < from: Xray Chest 1 View-PORTABLE IMMEDIATE (Xray Chest 1 View-PORTABLE IMMEDIATE .) (11.17.20 @ 00:48) >  IMPRESSION:      No focal lung opacities, pleural effusions, or pneumothorax. No radiographic evidence of tuberculosis infection.    < end of copied text >

## 2020-11-19 ENCOUNTER — TRANSCRIPTION ENCOUNTER (OUTPATIENT)
Age: 60
End: 2020-11-19

## 2020-11-19 VITALS
HEART RATE: 82 BPM | DIASTOLIC BLOOD PRESSURE: 61 MMHG | TEMPERATURE: 97 F | RESPIRATION RATE: 18 BRPM | SYSTOLIC BLOOD PRESSURE: 119 MMHG

## 2020-11-19 DIAGNOSIS — Z86.19 PERSONAL HISTORY OF OTHER INFECTIOUS AND PARASITIC DISEASES: ICD-10-CM

## 2020-11-19 LAB
GLUCOSE BLDC GLUCOMTR-MCNC: 107 MG/DL — HIGH (ref 70–99)
GLUCOSE BLDC GLUCOMTR-MCNC: 213 MG/DL — HIGH (ref 70–99)
TSH SERPL-MCNC: 1.11 UIU/ML — SIGNIFICANT CHANGE UP (ref 0.27–4.2)

## 2020-11-19 PROCEDURE — 99233 SBSQ HOSP IP/OBS HIGH 50: CPT

## 2020-11-19 RX ORDER — METHADONE HYDROCHLORIDE 40 MG/1
10 TABLET ORAL ONCE
Refills: 0 | Status: DISCONTINUED | OUTPATIENT
Start: 2020-11-19 | End: 2020-11-19

## 2020-11-19 RX ORDER — METHADONE HYDROCHLORIDE 40 MG/1
5 TABLET ORAL EVERY 6 HOURS
Refills: 0 | Status: DISCONTINUED | OUTPATIENT
Start: 2020-11-19 | End: 2020-11-19

## 2020-11-19 RX ADMIN — AMLODIPINE BESYLATE 10 MILLIGRAM(S): 2.5 TABLET ORAL at 05:24

## 2020-11-19 RX ADMIN — LISINOPRIL 20 MILLIGRAM(S): 2.5 TABLET ORAL at 05:24

## 2020-11-19 RX ADMIN — Medication 1 MILLIGRAM(S): at 05:24

## 2020-11-19 RX ADMIN — Medication 81 MILLIGRAM(S): at 11:48

## 2020-11-19 RX ADMIN — Medication 1 MILLIGRAM(S): at 09:00

## 2020-11-19 RX ADMIN — METHADONE HYDROCHLORIDE 10 MILLIGRAM(S): 40 TABLET ORAL at 11:48

## 2020-11-19 RX ADMIN — PANTOPRAZOLE SODIUM 40 MILLIGRAM(S): 20 TABLET, DELAYED RELEASE ORAL at 06:34

## 2020-11-19 NOTE — DISCHARGE NOTE PROVIDER - CARE PROVIDER_API CALL
Greg Cabrera (DO)  Medicine  Physicians  [address] [place] [id number]  Phone: [phone]  Fax: [phone]  Follow Up Time: 2 weeks    Dwayne Melara  Infectious Disease  [address] [place] [address]  Phone: [phone]  Fax: [phone]  Follow Up Time: 1 week

## 2020-11-19 NOTE — DISCHARGE NOTE PROVIDER - HOSPITAL COURSE
60M with PMHx of insulin dependent DM, HCV, polysubstance abuse (active heroin and cocaine user), HTN, HLD, GERD presents to ED following a positive QuantiFeron gold at rehab. Reports feeling more fatigued and reports shortness of breath and sweating. Admits to using heroin and cocaine 2 days ago.  The patient has history of being incarcerated. Denies fever/chills, weight loss/gain, chest pain, hemoptysis, abdominal pain, nausea, vomiting, headache, weakness, dizziness. No recent travel out of state/country, no sick contacts.   ED Vitals : 35.9 C, HR: 74, BP: 114/69 RR: 17 , SpO2: 100%  CXR: negative for acute disease   Labs: WBC 15K , Cr 1.7   ID consult was done: Per ID patient clear for discharge, will do PPD outpatient and treat for latent TB if >10mm.  Pending addiction medicine consult for possible opioid withdrawl.      60M with PMHx of insulin dependent DM, HCV, polysubstance abuse (active heroin and cocaine user), HTN, HLD, GERD presents to ED following a positive QuantiFeron gold at rehab. Reports feeling more fatigued and reports shortness of breath and sweating. Admits to using heroin and cocaine 2 days ago.  The patient has history of being incarcerated. Denies fever/chills, weight loss/gain, chest pain, hemoptysis, abdominal pain, nausea, vomiting, headache, weakness, dizziness. No recent travel out of state/country, no sick contacts.   ED Vitals : 35.9 C, HR: 74, BP: 114/69 RR: 17 , SpO2: 100%  CXR: negative for acute disease   Labs: WBC 15K , Cr 1.7   ID consult was done: Per ID patient clear for discharge, will do PPD outpatient and treat for latent TB if >10mm.  Pending addiction medicine consult for possible opioid withdrawl.     Patient was advised addiction medicine consult and inpatient rehab for active opioid withdrawal. Patient was explained the risk associated with opioid/cocaine use and withdrawal.   Patient wants to leave against medical advise. 60M with PMHx of insulin dependent DM, HCV, polysubstance abuse (active heroin and cocaine user), HTN, HLD, GERD presents to ED following a positive QuantiFeron gold at rehab. Reports feeling more fatigued and reports shortness of breath and sweating. Admits to using heroin and cocaine 2 days ago.  The patient has history of being incarcerated. Denies fever/chills, weight loss/gain, chest pain, hemoptysis, abdominal pain, nausea, vomiting, headache, weakness, dizziness. No recent travel out of state/country, no sick contacts.   ED Vitals : 35.9 C, HR: 74, BP: 114/69 RR: 17 , SpO2: 100%  CXR: negative for acute disease   Labs: WBC 15K , Cr 1.7   ID consult was done: Per ID patient clear for discharge, will do PPD outpatient and treat for latent TB if >10mm.  During the hospital admission patient was having withdrawal symptoms - sweating, fatigue, tremor, restlessness. Patient was started on a methadone taper dose. Pending addiction medicine consult for possible opioid withdrawl.    Patient was advised addiction medicine consult and inpatient rehab for active opioid withdrawal. Patient was explained the risk associated with opioid/cocaine use and withdrawal.   Patient wants to leave against medical advise.

## 2020-11-19 NOTE — DISCHARGE NOTE PROVIDER - PROVIDER TOKENS
PROVIDER:[TOKEN:[[id number]],FOLLOWUP:[2 weeks]],PROVIDER:[TOKEN:[[id number]],FOLLOWUP:[1 week]]

## 2020-11-19 NOTE — DISCHARGE NOTE PROVIDER - NSDCCPCAREPLAN_GEN_ALL_CORE_FT
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 In the hospital you were evaluated for possible active TB infection and you were found not to have an active infection right now. You will need to follow up outpatient with infectious disease doctor for a skin test and will be started on medication for latent TB if the test is positive.   Please seek medical help if you have any symptoms of active TB like cough, blood in sputum, chest pain, shortness of breath, fever, night sweats.      SECONDARY DISCHARGE DIAGNOSES  Diagnosis: Multiple substance abuse  Assessment and Plan of Treatment: You have a history of using multiple substances including heroin and cocaine. The substances you are currently experiencing could be due to heroin withdrawl. CATCH team saw you in the hospital and you were offered an option for inpatient rehab which you refused. Please seek medical help if you experience symptoms of withdrawl or intoxication. Please follow up with your outpatient rehab program ([facility]) for prevention of withdrawal and proper detoxification.    Diagnosis: ADOLFO (acute kidney injury)  Assessment and Plan of Treatment: Your creatinine was elevated when you came to the hospital. You were managed with IV fluids now your renal function is back to normal. Avoid dehydration.  You can follow up with your primary care doctor, or, if recommended in the discharge paperwork, you should follow up with a Kidney specialist called a Nephrologist.      Diagnosis: Hypertension  Assessment and Plan of Treatment: Hypertension  Hypertension, commonly called high blood pressure, is when the force of blood pumping through your arteries is too strong. Hypertension forces your heart to work harder to pump blood. Your arteries may become narrow or stiff. Having untreated or uncontrolled hypertension for a long period of time can cause heart attack, stroke, kidney disease, and other problems. If started on a medication, take exactly as prescribed by your health care professional. Maintain a healthy lifestyle and follow up with your primary care physician.  SEEK IMMEDIATE MEDICAL CARE IF YOU HAVE ANY OF THE FOLLOWING SYMPTOMS: severe headache, confusion, chest pain, abdominal pain, vomiting, or shortness of breath.      Diagnosis: Diabetes mellitus  Assessment and Plan of Treatment: Diabetes is a chronic condition caused by high blood sugar levels. Your blood sugar levels become high because your body does not have enough insulin. Insulin helps move sugar out of the blood so it can be used for energy. Increased sugar in your blood can cause problems in several organs of your body including but not limited to your blood vessels, your kidneys, your brain, and your eyes. The lack of insulin forces your body to use fat instead of sugar for energy. This can be dangerous if not controlled.  Seek Medical Attention If:  You have a seizure.  You begin to breathe fast, or are short of breath.  You become weak and confused.  You are more drowsy than usual.  You have fruity, sweet breath.  You have severe, new stomach pain and are vomiting.  Your blood sugar level is lower or higher than your healthcare provider says it should be.  You have ketones in your blood or urine.  You have a fever or chills.  You are more thirsty than usual.  You are urinating more often than usual.  You have questions or concerns about your condition or care.  Insulin and diabetes medicine decreases the amount of sugar in your blood.  The best way to prevent problems from Diabetes is to control your blood sugars.  Monitor your blood sugar levels closely. Administer Insulin as directed by your physician.   Speak with your doctor and/or a nutritionist about the best way to change your lifestyle and dietary habits to avoid any problems from Diabetes in the future.       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 In the hospital you were evaluated for possible active TB infection and you were found not to have an active infection right now. You will need to follow up outpatient with infectious disease doctor for a skin test and will be started on medication for latent TB if the test is positive.   Please seek medical help if you have any symptoms of active TB like cough, blood in sputum, chest pain, shortness of breath, fever, night sweats.      SECONDARY DISCHARGE DIAGNOSES  Diagnosis: Multiple substance abuse  Assessment and Plan of Treatment: You have a history of using multiple substances including heroin and cocaine. The substances you are currently experiencing could be due to heroin withdrawl. CATCH team saw you in the hospital and you were offered an option for inpatient rehab which you refused. Please seek medical help if you experience symptoms of withdrawl or intoxication. Please follow up with your outpatient rehab program ([facility]) for prevention of withdrawal and proper detoxification.  You still have active opioid withdrawal symptoms. You were advised addiction medicine consult and inpatient rehab for active opioid withdrawal. You were explained the risk associated with opioid/cocaine use and withdrawal including death.  You are leaving against medical advise.  Please seek immediate help if your symptoms worsen.    Diagnosis: ADOLFO (acute kidney injury)  Assessment and Plan of Treatment: Your creatinine was elevated when you came to the hospital. You were managed with IV fluids now your renal function is back to normal. Avoid dehydration.  You can follow up with your primary care doctor, or, if recommended in the discharge paperwork, you should follow up with a Kidney specialist called a Nephrologist.      Diagnosis: Hypertension  Assessment and Plan of Treatment: Hypertension  Hypertension, commonly called high blood pressure, is when the force of blood pumping through your arteries is too strong. Hypertension forces your heart to work harder to pump blood. Your arteries may become narrow or stiff. Having untreated or uncontrolled hypertension for a long period of time can cause heart attack, stroke, kidney disease, and other problems. If started on a medication, take exactly as prescribed by your health care professional. Maintain a healthy lifestyle and follow up with your primary care physician.  SEEK IMMEDIATE MEDICAL CARE IF YOU HAVE ANY OF THE FOLLOWING SYMPTOMS: severe headache, confusion, chest pain, abdominal pain, vomiting, or shortness of breath.      Diagnosis: Diabetes mellitus  Assessment and Plan of Treatment: Diabetes is a chronic condition caused by high blood sugar levels. Your blood sugar levels become high because your body does not have enough insulin. Insulin helps move sugar out of the blood so it can be used for energy. Increased sugar in your blood can cause problems in several organs of your body including but not limited to your blood vessels, your kidneys, your brain, and your eyes. The lack of insulin forces your body to use fat instead of sugar for energy. This can be dangerous if not controlled.  Seek Medical Attention If:  You have a seizure.  You begin to breathe fast, or are short of breath.  You become weak and confused.  You are more drowsy than usual.  You have fruity, sweet breath.  You have severe, new stomach pain and are vomiting.  Your blood sugar level is lower or higher than your healthcare provider says it should be.  You have ketones in your blood or urine.  You have a fever or chills.  You are more thirsty than usual.  You are urinating more often than usual.  You have questions or concerns about your condition or care.  Insulin and diabetes medicine decreases the amount of sugar in your blood.  The best way to prevent problems from Diabetes is to control your blood sugars.  Monitor your blood sugar levels closely. Administer Insulin as directed by your physician.   Speak with your doctor and/or a nutritionist about the best way to change your lifestyle and dietary habits to avoid any problems from Diabetes in the future.

## 2020-11-19 NOTE — DISCHARGE NOTE NURSING/CASE MANAGEMENT/SOCIAL WORK - PATIENT PORTAL LINK FT
You can access the FollowMyHealth Patient Portal offered by [facility] by registering at the following website: [email or web address]. By joining [employer]’s FollowMyHealth portal, you will also be able to view your health information using other applications (apps) compatible with our system.

## 2020-11-19 NOTE — DISCHARGE NOTE PROVIDER - NSDCMRMEDTOKEN_GEN_ALL_CORE_FT
amLODIPine 10 mg oral tablet: 1 tab(s) orally once a day   Aspir 81 oral delayed release tablet: 1 tab(s) orally once a day   atorvastatin 10 mg oral tablet: 1 tab(s) orally once a day (at bedtime)   HumuLIN R 100 units/mL injectable solution: 5 unit(s) injectable 2 times a day   ibuprofen 600 mg oral tablet: 1 tab(s) orally 3 times a day   Lantus 100 units/mL subcutaneous solution: 35 unit(s) subcutaneous once a day (at bedtime)   lisinopril 30 mg oral tablet: 1 tab(s) orally once a day   metFORMIN 1000 mg oral tablet: 1 tab(s) orally 2 times a day   omeprazole 20 mg oral delayed release capsule: 1 cap(s) orally once a day

## 2020-11-19 NOTE — PROGRESS NOTE ADULT - ATTENDING COMMENTS
Patient seen and examined independently. I agree with the resident's note, physical exam, and plan except as below.  Vital Signs Last 24 Hrs  T(C): 36.2 (19 Nov 2020 05:39), Max: 36.6 (18 Nov 2020 20:07)  T(F): 97.2 (19 Nov 2020 05:39), Max: 97.9 (18 Nov 2020 20:07)  HR: 82 (19 Nov 2020 05:39) (73 - 82)  BP: 119/61 (19 Nov 2020 05:39) (119/61 - 122/66)  BP(mean): --  RR: 18 (19 Nov 2020 05:39) (17 - 18)  SpO2: --  nad  [id number]  ctabl  softntn+bs  no cce  agitated, asthenia     #positive quantiferon - CXR negative, AFB negative in sputum  per ID - outpt PPD - if positive then would treat as latent TB    #polysubstance abuse - heroin and cocaine - pt was in rehab prior to coming in   and appears to have withdrawal symtpoms at this time  dc ativan orders  start methadone taper  call CATCH team - should return to detox      addendum - pt received a dose of methadone  - but changed his mind about going to detox/rehab - signed out AMA - pt had capacity to make decision and was explained risk/benefit - understood and was able to repeat back - still wanted to sign out AMA

## 2020-11-19 NOTE — DISCHARGE NOTE NURSING/CASE MANAGEMENT/SOCIAL WORK - NSDCPEEMAIL_GEN_ALL_CORE
[facility] Tobacco [facility] email [email or web address]

## 2020-11-19 NOTE — PROGRESS NOTE ADULT - SUBJECTIVE AND OBJECTIVE BOX
SUBJECTIVE:   LENGTH OF HOSPITAL STAY: 2d    CHIEF COMPLAINT:  Patient is a 60y old  Male who presents with a chief complaint of positive quantiferon gold test (18 Nov 2020 10:34)      Events over the past 24 hours:  Patient was seen and examined in the bedside this morning. The patient is lying comfortably on the bed. There were no acute events overnight.    REVIEW OF SYSTEMS  Negative Except as mentioned above.    ALLERGIES:  No Known Allergies        MEDICATIONS:  STANDING MEDICATIONS  amLODIPine   Tablet 10 milliGRAM(s) Oral daily  aspirin enteric coated 81 milliGRAM(s) Oral daily  atorvastatin 10 milliGRAM(s) Oral at bedtime  enoxaparin Injectable 40 milliGRAM(s) SubCutaneous at bedtime  influenza   Vaccine 0.5 milliLiter(s) IntraMuscular once  lisinopril 20 milliGRAM(s) Oral daily  LORazepam     Tablet   Oral   LORazepam     Tablet 1 milliGRAM(s) Oral every 4 hours  LORazepam     Tablet 0.5 milliGRAM(s) Oral every 4 hours  pantoprazole    Tablet 40 milliGRAM(s) Oral before breakfast    PRN MEDICATIONS        OBJECTIVE:  VITALS:   T(F): 97.2 (11-19-20 @ 05:39), Max: 97.9 (11-18-20 @ 20:07)  HR: 82 (11-19-20 @ 05:39) (64 - 82)  BP: 119/61 (11-19-20 @ 05:39) (119/61 - 176/84)  BP(mean): --  RR: 18 (11-19-20 @ 05:39) (17 - 18)  SpO2: 99% (11-18-20 @ 06:44) (99% - 99%)    I&O's:   I&O's Summary      PHYSICAL EXAM:  General: No acute distress  HEENT: Normocephalic, atraumatic, PERRLA, EOMI - Normal, No pallor, icterus, cyanosis; JVP- not elevated  PULM: b/l equal and clear breath sounds, no wheeze, crepitations  CVS: s1s2 normal, no murmurs, rubs or gallops  GI: Abdomen is soft, non-tender, no organomegaly, BS +  MSK: No joint/limb swelling, tenderness, deformity  SKIN: No rashes noted  NEURO: Alert and Oriented x 3, no focal neurological deficits noted    LABS:                        13.4   4.75  )-----------( 229      ( 18 Nov 2020 11:09 )             42.5             11-18    138  |  100  |  12  ----------------------------<  109<H>  4.5   |  28  |  0.7    Ca    9.1      18 Nov 2020 11:09  Mg     2.1     11-17    TPro  6.4  /  Alb  4.1  /  TBili  0.7  /  DBili  x   /  AST  30  /  ALT  35  /  AlkPhos  90  11-18    LIVER FUNCTIONS - ( 18 Nov 2020 11:09 )  Alb: 4.1 g/dL / Pro: 6.4 g/dL / ALK PHOS: 90 U/L / ALT: 35 U/L / AST: 30 U/L / GGT: x            Culture - Acid Fast - Sputum w/Smear (collected 18 Nov 2020 07:20)  Source: .Sputum Sputum    Culture - Blood (collected 17 Nov 2020 17:05)  Source: .Blood None  Preliminary Report (18 Nov 2020 23:01):    No growth to date.      CAPILLARY BLOOD GLUCOSE      POCT Blood Glucose.: 135 mg/dL (18 Nov 2020 20:47)  POCT Blood Glucose.: 134 mg/dL (18 Nov 2020 16:36)  POCT Blood Glucose.: 105 mg/dL (18 Nov 2020 06:51)        RADIOLOGY & ADDITIONAL TESTS:                       SUBJECTIVE:   LENGTH OF HOSPITAL STAY: 2d    CHIEF COMPLAINT:  Patient is a 60y old  Male who presents with a chief complaint of positive quantiferon gold test (18 Nov 2020 10:34)      Events over the past 24 hours:  Patient was seen and examined in the bedside this morning. The patient is lying comfortably on the bed. There were no acute events overnight.    REVIEW OF SYSTEMS  Negative Except as mentioned above.    ALLERGIES:  No Known Allergies    Social History:   4 cigarettes/day  No alcohol  Used cocaine and Heroin - IV injection - last use 2 days ago    MEDICATIONS:  STANDING MEDICATIONS  amLODIPine   Tablet 10 milliGRAM(s) Oral daily  aspirin enteric coated 81 milliGRAM(s) Oral daily  atorvastatin 10 milliGRAM(s) Oral at bedtime  enoxaparin Injectable 40 milliGRAM(s) SubCutaneous at bedtime  influenza   Vaccine 0.5 milliLiter(s) IntraMuscular once  lisinopril 20 milliGRAM(s) Oral daily  LORazepam     Tablet   Oral   LORazepam     Tablet 1 milliGRAM(s) Oral every 4 hours  LORazepam     Tablet 0.5 milliGRAM(s) Oral every 4 hours  pantoprazole    Tablet 40 milliGRAM(s) Oral before breakfast    PRN MEDICATIONS        OBJECTIVE:  VITALS:   T(F): 97.2 (11-19-20 @ 05:39), Max: 97.9 (11-18-20 @ 20:07)  HR: 82 (11-19-20 @ 05:39) (64 - 82)  BP: 119/61 (11-19-20 @ 05:39) (119/61 - 176/84)  BP(mean): --  RR: 18 (11-19-20 @ 05:39) (17 - 18)  SpO2: 99% (11-18-20 @ 06:44) (99% - 99%)    I&O's:   I&O's Summary      PHYSICAL EXAM:  General: No acute distress  HEENT: Normocephalic, atraumatic, PERRLA, EOMI - Normal, No pallor, icterus, cyanosis; JVP- not elevated  PULM: b/l equal and clear breath sounds, no wheeze, crepitations  CVS: s1s2 normal, no murmurs, rubs or gallops  GI: Abdomen is soft, non-tender, no organomegaly, BS +  MSK: No joint/limb swelling, tenderness, deformity  SKIN: No rashes noted  NEURO: Alert and Oriented x 3, no focal neurological deficits noted    LABS:                        13.4   4.75  )-----------( 229      ( 18 Nov 2020 11:09 )             42.5             11-18    138  |  100  |  12  ----------------------------<  109<H>  4.5   |  28  |  0.7    Ca    9.1      18 Nov 2020 11:09  Mg     2.1     11-17    TPro  6.4  /  Alb  4.1  /  TBili  0.7  /  DBili  x   /  AST  30  /  ALT  35  /  AlkPhos  90  11-18    LIVER FUNCTIONS - ( 18 Nov 2020 11:09 )  Alb: 4.1 g/dL / Pro: 6.4 g/dL / ALK PHOS: 90 U/L / ALT: 35 U/L / AST: 30 U/L / GGT: x            Culture - Acid Fast - Sputum w/Smear (collected 18 Nov 2020 07:20)  Source: .Sputum Sputum    Culture - Blood (collected 17 Nov 2020 17:05)  Source: .Blood None  Preliminary Report (18 Nov 2020 23:01):    No growth to date.      CAPILLARY BLOOD GLUCOSE      POCT Blood Glucose.: 135 mg/dL (18 Nov 2020 20:47)  POCT Blood Glucose.: 134 mg/dL (18 Nov 2020 16:36)  POCT Blood Glucose.: 105 mg/dL (18 Nov 2020 06:51)        RADIOLOGY & ADDITIONAL TESTS:

## 2020-11-19 NOTE — PROGRESS NOTE ADULT - ASSESSMENT
60M with PMHx of insulin dependent DM, HCV, polysubstance abuse (active heroin and cocaine user), HTN, HLD, GERD presents to ED for positive quantiferon gold ; patient reports shortness of breath and sweating and tiredness.    # Positive QuantiFeron gold , secondary to latent TB  - h/o being incarcerated   - HIV negative  - no clinical symptoms, CXR negative for acute disease  - ID consult done - PPD outpatient. If >10mm then INH with vitamin B6 for 9 months    # Fatigue  - likely secondary to heroin/cocaine withdrawal    - start Ciwa ativan PO  - f/u Bcx as high risk for I.E  - leukocytosis on admission resolved with no abx, unlikely to be infectious etiology    # ADOLFO   - resolved, back to baseline (1.7-->0.7)  - likely secondary to pre-renal cause due to decreased po intake and recent drug use   - avoid nephrotoxic agents  - IVF     # Polysubstance abuse  - on ciwa for withdrawal  - Would require buprenorphine if opioid/cocaine withdrawal  - Was at [facility] detox for 6 days and was d/c on monday.   - Offered Inpatient rehab and long term chemical dependency program but declined.      # h/o DM  - hold home medications  - F/S AC QHS , if > 180, start insulin  - c/w statin , asa    # h/o HTN , controlled  - c/w amlodipine and lisinopril    # h/o HCV , treated     #DVT PPX: lovenox   #GI PPX: PPI  #Activity: as tolerated  # diet: carb consistent  #dispo: Pending drug withdrawal treatment  # FULL CODE      60M with PMHx of insulin dependent DM, HCV, polysubstance abuse (active heroin and cocaine user), HTN, HLD, GERD presents to ED for positive quantiferon gold ; patient reports shortness of breath and sweating and tiredness.    # Positive QuantiFeron gold , secondary to latent TB  - h/o being incarcerated   - HIV negative  - no clinical symptoms, CXR negative for acute disease  - ID consult done - PPD outpatient. If >10mm then INH with vitamin B6 for 9 months    # Fatigue  - likely secondary to heroin/cocaine withdrawal    - start Ciwa ativan PO  - f/u Bcx as high risk for I.E  - leukocytosis on admission resolved with no abx, unlikely to be infectious etiology    # ADOLFO   - resolved, back to baseline (1.7-->0.7)  - likely secondary to pre-renal cause due to decreased po intake and recent drug use   - avoid nephrotoxic agents  - IVF     # Polysubstance abuse  - on ciwa for withdrawal  - Would require buprenorphine if opioid/cocaine withdrawal  - Was at [facility] for 6 days and was d/c on monday.   - Offered Inpatient rehab and long term chemical dependency program but declined.      # h/o DM  - hold home medications  - F/S AC QHS , if > 180, start insulin  - c/w statin , asa  - A1c - 7.6    # h/o HTN , controlled  - c/w amlodipine and lisinopril    # h/o HCV , treated     #DVT PPX: lovenox   #GI PPX: PPI  #Activity: as tolerated  # diet: carb consistent  #dispo: Pending drug withdrawal treatment  # FULL CODE      60M with PMHx of insulin dependent DM, HCV, polysubstance abuse (active heroin and cocaine user), HTN, HLD, GERD presents to ED for positive quantiferon gold ; patient reports shortness of breath and sweating and tiredness.    # Positive QuantiFeron gold , secondary to latent TB  - h/o being incarcerated   - HIV negative  - no clinical symptoms, CXR negative for acute disease  - ID consult done - PPD outpatient. If >10mm then INH with vitamin B6 for 9 months    # Opioid withdrawl  - Fatigue, pain, sweating, restlessness  - likely secondary to heroin withdrawal    - f/u Bcx as high risk for I.E  - leukocytosis on admission resolved with no abx, unlikely to be infectious etiology  - Methadone tapering dose  - Addiction medicine consult for inpatient rehab placement    # ADOLFO   - resolved, back to baseline (1.7-->0.7)  - likely secondary to pre-renal cause due to decreased po intake and recent drug use   - avoid nephrotoxic agents  - IVF     # Polysubstance abuse  - Was at [facility] for 6 days and was d/c on monday.   - Offered Inpatient rehab and long term chemical dependency program but declined.      # h/o DM  - hold home medications  - F/S AC QHS , if > 180, start insulin  - c/w statin , asa  - A1c - 7.6    # h/o HTN , controlled  - c/w amlodipine and lisinopril    # h/o HCV , treated     #DVT PPX: lovenox   #GI PPX: PPI  #Activity: as tolerated  # diet: carb consistent  #dispo: Pending drug withdrawal treatment  # FULL CODE

## 2020-11-21 LAB — HCV RNA FLD QL NAA+PROBE: SIGNIFICANT CHANGE UP

## 2020-11-22 LAB
CULTURE RESULTS: SIGNIFICANT CHANGE UP
SPECIMEN SOURCE: SIGNIFICANT CHANGE UP

## 2020-11-24 DIAGNOSIS — I10 ESSENTIAL (PRIMARY) HYPERTENSION: ICD-10-CM

## 2020-11-24 DIAGNOSIS — R76.11 NONSPECIFIC REACTION TO TUBERCULIN SKIN TEST WITHOUT ACTIVE TUBERCULOSIS: ICD-10-CM

## 2020-11-24 DIAGNOSIS — Z79.4 LONG TERM (CURRENT) USE OF INSULIN: ICD-10-CM

## 2020-11-24 DIAGNOSIS — F17.210 NICOTINE DEPENDENCE, CIGARETTES, UNCOMPLICATED: ICD-10-CM

## 2020-11-24 DIAGNOSIS — Z86.19 PERSONAL HISTORY OF OTHER INFECTIOUS AND PARASITIC DISEASES: ICD-10-CM

## 2020-11-24 DIAGNOSIS — D72.829 ELEVATED WHITE BLOOD CELL COUNT, UNSPECIFIED: ICD-10-CM

## 2020-11-24 DIAGNOSIS — R53.83 OTHER FATIGUE: ICD-10-CM

## 2020-11-24 DIAGNOSIS — E78.5 HYPERLIPIDEMIA, UNSPECIFIED: ICD-10-CM

## 2020-11-24 DIAGNOSIS — F14.13 COCAINE ABUSE, UNSPECIFIED WITH WITHDRAWAL: ICD-10-CM

## 2020-11-24 DIAGNOSIS — F11.23 OPIOID DEPENDENCE WITH WITHDRAWAL: ICD-10-CM

## 2020-11-24 DIAGNOSIS — R06.02 SHORTNESS OF BREATH: ICD-10-CM

## 2020-11-24 DIAGNOSIS — G25.2 OTHER SPECIFIED FORMS OF TREMOR: ICD-10-CM

## 2020-11-24 DIAGNOSIS — R45.1 RESTLESSNESS AND AGITATION: ICD-10-CM

## 2020-11-24 DIAGNOSIS — N17.9 ACUTE KIDNEY FAILURE, UNSPECIFIED: ICD-10-CM

## 2020-11-24 DIAGNOSIS — E11.9 TYPE 2 DIABETES MELLITUS WITHOUT COMPLICATIONS: ICD-10-CM

## 2020-11-24 DIAGNOSIS — R63.8 OTHER SYMPTOMS AND SIGNS CONCERNING FOOD AND FLUID INTAKE: ICD-10-CM

## 2020-11-24 DIAGNOSIS — K21.9 GASTRO-ESOPHAGEAL REFLUX DISEASE WITHOUT ESOPHAGITIS: ICD-10-CM

## 2021-01-09 LAB
CULTURE RESULTS: SIGNIFICANT CHANGE UP
SPECIMEN SOURCE: SIGNIFICANT CHANGE UP

## 2021-03-17 NOTE — ED ADULT NURSE NOTE - NS_NURSE_DISC_TEACHING_YN_ED_ALL_ED
Quality 431: Preventive Care And Screening: Unhealthy Alcohol Use - Screening: Patient screened for unhealthy alcohol use using a single question and scores less than 2 times per year
Quality 110: Preventive Care And Screening: Influenza Immunization: Influenza Immunization Administered during Influenza season
Quality 226: Preventive Care And Screening: Tobacco Use: Screening And Cessation Intervention: Patient screened for tobacco use and is an ex/non-smoker
Detail Level: Detailed
Yes

## 2021-06-09 ENCOUNTER — EMERGENCY (EMERGENCY)
Facility: HOSPITAL | Age: 61
LOS: 1 days | Discharge: HOME | End: 2021-06-09
Attending: EMERGENCY MEDICINE | Admitting: EMERGENCY MEDICINE
Payer: COMMERCIAL

## 2021-06-09 VITALS
HEART RATE: 76 BPM | OXYGEN SATURATION: 97 % | HEIGHT: 65 IN | WEIGHT: 160.06 LBS | DIASTOLIC BLOOD PRESSURE: 75 MMHG | TEMPERATURE: 98 F | SYSTOLIC BLOOD PRESSURE: 137 MMHG | RESPIRATION RATE: 18 BRPM

## 2021-06-09 VITALS
HEART RATE: 71 BPM | DIASTOLIC BLOOD PRESSURE: 70 MMHG | TEMPERATURE: 98 F | SYSTOLIC BLOOD PRESSURE: 136 MMHG | RESPIRATION RATE: 18 BRPM | OXYGEN SATURATION: 97 %

## 2021-06-09 DIAGNOSIS — R07.89 OTHER CHEST PAIN: ICD-10-CM

## 2021-06-09 DIAGNOSIS — E78.5 HYPERLIPIDEMIA, UNSPECIFIED: ICD-10-CM

## 2021-06-09 DIAGNOSIS — F17.210 NICOTINE DEPENDENCE, CIGARETTES, UNCOMPLICATED: ICD-10-CM

## 2021-06-09 DIAGNOSIS — I10 ESSENTIAL (PRIMARY) HYPERTENSION: ICD-10-CM

## 2021-06-09 DIAGNOSIS — G47.30 SLEEP APNEA, UNSPECIFIED: ICD-10-CM

## 2021-06-09 DIAGNOSIS — K21.9 GASTRO-ESOPHAGEAL REFLUX DISEASE WITHOUT ESOPHAGITIS: ICD-10-CM

## 2021-06-09 LAB — TROPONIN T SERPL-MCNC: 0.01 NG/ML — SIGNIFICANT CHANGE UP (ref 0–0.01)

## 2021-06-09 PROCEDURE — 84484 ASSAY OF TROPONIN QUANT: CPT

## 2021-06-09 PROCEDURE — 36415 COLL VENOUS BLD VENIPUNCTURE: CPT

## 2021-06-09 PROCEDURE — 71045 X-RAY EXAM CHEST 1 VIEW: CPT

## 2021-06-09 PROCEDURE — 99284 EMERGENCY DEPT VISIT MOD MDM: CPT

## 2021-06-09 PROCEDURE — 85025 COMPLETE CBC W/AUTO DIFF WBC: CPT

## 2021-06-09 PROCEDURE — 93005 ELECTROCARDIOGRAM TRACING: CPT

## 2021-06-09 PROCEDURE — 80053 COMPREHEN METABOLIC PANEL: CPT

## 2021-06-09 PROCEDURE — 93010 ELECTROCARDIOGRAM REPORT: CPT

## 2021-06-09 PROCEDURE — 99283 EMERGENCY DEPT VISIT LOW MDM: CPT | Mod: 25

## 2021-06-09 PROCEDURE — 71045 X-RAY EXAM CHEST 1 VIEW: CPT | Mod: 26

## 2021-06-09 NOTE — ED ADULT NURSE REASSESSMENT NOTE - NS ED NURSE REASSESS COMMENT FT1
All labs, tests resulted, no chest pain or any other symptom complaint. discharged to home in stable condition.  Vital signs stable.

## 2021-06-09 NOTE — ED ADULT NURSE NOTE - NSIMPLEMENTINTERV_GEN_ALL_ED
Implemented All Universal Safety Interventions:  [place] to call system. Call bell, personal items and telephone within reach. Instruct patient to call for assistance. Room bathroom lighting operational. Non-slip footwear when patient is off stretcher. Physically safe environment: no spills, clutter or unnecessary equipment. Stretcher in lowest position, wheels locked, appropriate side rails in place.

## 2021-06-09 NOTE — ED PROVIDER NOTE - NSFOLLOWUPINSTRUCTIONS_ED_ALL_ED_FT
Please follow up with your primary care doctor and cardiologist in a week. Return to the ER if you develop any concerning symptoms.     Chest Pain    Chest pain can be caused by many different conditions which may or may not be dangerous. Causes include heartburn, lung infections, heart attack, blood clot in lungs, skin infections, strain or damage to muscle, cartilage, or bones, etc. In addition to a history and physical examination, an electrocardiogram (ECG) or other lab tests may have been performed to determine the cause of your chest pain. Follow up with your primary care provider or with a cardiologist as instructed.     SEEK IMMEDIATE MEDICAL CARE IF YOU HAVE ANY OF THE FOLLOWING SYMPTOMS: worsening chest pain, coughing up blood, unexplained back/neck/jaw pain, severe abdominal pain, dizziness or lightheadedness, fainting, shortness of breath, sweaty or clammy skin, vomiting, or racing heart beat. These symptoms may represent a serious problem that is an emergency. Do not wait to see if the symptoms will go away. Get medical help right away. Call 911 and do not drive yourself to the hospital.

## 2021-06-09 NOTE — ED PROVIDER NOTE - MUSCULOSKELETAL, MLM
Spine appears normal, range of motion is not limited, no muscle or joint tenderness. No swelling/edema. Spine appears normal, range of motion is not limited.

## 2021-06-09 NOTE — ED PROVIDER NOTE - CARE PROVIDER_API CALL
Ameya Marsh  CARDIOLOGY  [address] [address]  Phone: [phone]  Fax: [phone]  Follow Up Time:     Effie Wild)  Internal Medicine  [address] [place] [address]  Phone: [phone]  Fax: [phone]  Follow Up Time:

## 2021-06-09 NOTE — ED PROVIDER NOTE - CARDIAC, MLM
Normal rate, regular rhythm.  Heart sounds S1, S2. No acute ST/T changes. Normal rate, regular rhythm.  Heart sounds S1, S2.

## 2021-06-09 NOTE — ED PROVIDER NOTE - NSFOLLOWUPCLINICS_GEN_ALL_ED_FT
[facility]  Family Medicine  [address]  [place], [place] [address]  Phone: [phone]  Fax:   Follow Up Time: 4-6 Days

## 2021-06-09 NOTE — ED ADULT NURSE REASSESSMENT NOTE - NS ED NURSE REASSESS COMMENT FT1
Patient a/oX3, states no chest pain or SOB at this time,s tates feeling better, no lightheadedenss or any other symptoms.  Vital signs stable.  Right AC PIV #18 in place, all labs sent, no complications.  Results and disposition pending.  Stable and comfortable.

## 2021-06-09 NOTE — ED PROVIDER NOTE - CLINICAL SUMMARY MEDICAL DECISION MAKING FREE TEXT BOX
59 y/o M PMHx HTN, HLD, gastrointestinal reflux, sleep apnea presents today with chest pain. Patient notes feeling a tight squeezing, burning sensation in the center of his chest around 10:30 am while attending a group therapy session for drug abuse. On arrival to ED, patient no longer has chest pain. EKG shows no acute ST/T changes. Awaiting labs and CXR. 61 y/o M PMHx HTN, HLD, gastrointestinal reflux, sleep apnea presents today with chest pain. Patient notes feeling a tight squeezing, burning sensation in the center of his chest around 10:30 am while attending a group therapy session for drug abuse. On arrival to ED, patient no longer has chest pain. EKG shows no acute ST/T changes. Awaiting labs and CXR.    ED course: Pt HD stable. ECG and CXR with NAD. Trop X 2 in ED negative. Pt advised to f/up with PCP and cardiologist ASAP. Strict return precautions given.

## 2021-06-09 NOTE — ED PROVIDER NOTE - OBJECTIVE STATEMENT
61 y/o M PMHx HTN, HLD, gastrointestinal reflux, sleep apnea presents today with chest pain. Patient notes feeling a tight squeezing, burning sensation in the center of his chest around 10:30 am while attending a group therapy session for drug abuse. Patient notes some shaking and inability to speak. On arrival, patient no longer has symptoms of chest pain. Notes some vomiting last night due to food intake. Patient notes hx of use of heroin and cocaine, last used 2 mo ago and currently smokes 3 cigarettes per day. Denies hx of chest pain, fevers, chills, nausea, SOB, and hx of alcohol use. Patient is fully vaccinated for COVID as of April. 59 y/o M PMHx HTN, HLD, GERD, sleep apnea presents today with chest pain. Patient notes feeling a tight squeezing, burning sensation in the center of his chest around 10:30 am while attending a group therapy session for drug abuse. On arrival, patient no longer has symptoms of chest pain. Notes some vomiting last night due to some restaurant food intake. Patient notes hx of use of heroin and cocaine, last used 2 mo ago and currently smokes 3 cigarettes per day. Denies hx of chest pain, fevers, chills, nausea, SOB, and hx of alcohol use. Patient is fully vaccinated for COVID 19 as of April.

## 2021-06-09 NOTE — ED ADULT NURSE REASSESSMENT NOTE - NS ED NURSE REASSESS COMMENT FT1
Patient a/oX3, no chest pain , SOB or any symptom complaint, states feeling better.  Vital signs stable.  Repeat Trop sent to lab.  Results and disposition penidng.

## 2021-06-09 NOTE — ED ADULT NURSE NOTE - OBJECTIVE STATEMENT
61 y/o male reiceved into the ED, axox3, stating that he is having chest pain.  Pt denies having nausea or vomiting at this time.  Pt. reports having pain that comes and goes and he becomes SOB when he is exerting himself only.

## 2022-02-15 ENCOUNTER — TRANSCRIPTION ENCOUNTER (OUTPATIENT)
Age: 62
End: 2022-02-15

## 2022-04-27 NOTE — ED ADULT TRIAGE NOTE - NSWEIGHTCALCTOOLDRUG_GEN_A_CORE
note:  Received a message from ELISA Luna, pt will be dc tomorrow. Prescription for Eliquis will be sent to [facility] as [facility] is not contracted with pt's insurance.     used

## 2022-09-01 NOTE — DISCHARGE NOTE NURSING/CASE MANAGEMENT/SOCIAL WORK - NSDCPEWEB_GEN_ALL_CORE
[place] website --- [email or web address] website --- [email or web address]
Pt seen/reviewed/discussed with resident; I concur with findings & plan.

## 2022-09-24 ENCOUNTER — NON-APPOINTMENT (OUTPATIENT)
Age: 62
End: 2022-09-24

## 2023-06-09 NOTE — PATIENT PROFILE ADULT - ARE SIGNIFICANT INDICATORS COMPLETE.
Pt is aware that I called his pharmacy and gave them the discount card info. The cost of the medication is $ 50.   Yes

## 2024-02-26 PROBLEM — K21.9 GASTRO-ESOPHAGEAL REFLUX DISEASE WITHOUT ESOPHAGITIS: Chronic | Status: ACTIVE | Noted: 2020-11-16

## 2024-02-26 PROBLEM — E11.9 TYPE 2 DIABETES MELLITUS WITHOUT COMPLICATIONS: Chronic | Status: ACTIVE | Noted: 2020-11-16

## 2024-02-26 PROBLEM — E78.5 HYPERLIPIDEMIA, UNSPECIFIED: Chronic | Status: ACTIVE | Noted: 2020-11-16

## 2024-02-26 PROBLEM — B19.20 UNSPECIFIED VIRAL HEPATITIS C WITHOUT HEPATIC COMA: Chronic | Status: ACTIVE | Noted: 2020-11-16

## 2024-06-06 NOTE — ED PROVIDER NOTE - QRS
[de-identified] : Patient allowed to gently start resuming activities. Discussed change to medication prescription and usage. Offered cortisone steroid injection. Bracing options discussed with patient. Hyaluronic Acid inj pamphlet given to pt. try topical lidocaine reviewed current medications being used by this patient will asp and csi 06/06/2024  RE:  ERNESTINE GUERRERO   [id number]  Attention:  Nurse Reviewer /Medical Director  I am writing this letter as a medical necessity for HA euflexxa R knee Patient has tried analgesics, non-steroid anti-inflammatory agents,  physical therapy, hot or cold compresses,injections of corticosteroids, etc)  which in combination or by themselves has not worked. Based on my patient's condition, I strongly believe that the Hyaluronic aid injections is medically needed.   Thank you for your time and consideration.     RE:  ERNESTINE GUERRERO   [id number]   Attention:  Nurse Reviewer /Medical Director    Based on my patient's condition, I strongly believe that the MRI arth R knee is medically.necessary.   The patient has failed oral meds, injections and PT and conservative treatment in combination or by themselves and therefore needs the MRI.   The MRI will dictate further treatment t recommendations. 
88
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
## 2024-12-30 NOTE — ED ADULT NURSE NOTE - CAS TRG GENERAL NORM CIRC DET
Mom called in regarding patient have a fever since yesterday.  Request for a nurse to call for guidance    Strong peripheral pulses